# Patient Record
Sex: FEMALE | Race: WHITE | Employment: FULL TIME | URBAN - METROPOLITAN AREA
[De-identification: names, ages, dates, MRNs, and addresses within clinical notes are randomized per-mention and may not be internally consistent; named-entity substitution may affect disease eponyms.]

---

## 2017-05-11 ENCOUNTER — EMPLOYEE WELLNESS (OUTPATIENT)
Dept: OTHER | Age: 42
End: 2017-05-11

## 2017-05-11 LAB
CHOLESTEROL, TOTAL: 149 MG/DL (ref 0–199)
GLUCOSE BLD-MCNC: 86 MG/DL (ref 70–99)
HDLC SERPL-MCNC: 42 MG/DL (ref 40–60)
LDL CHOLESTEROL CALCULATED: 94 MG/DL
TRIGL SERPL-MCNC: 66 MG/DL (ref 0–150)

## 2017-05-15 ENCOUNTER — TELEPHONE (OUTPATIENT)
Dept: FAMILY MEDICINE CLINIC | Age: 42
End: 2017-05-15

## 2018-02-16 DIAGNOSIS — B00.1 HERPES LABIALIS: ICD-10-CM

## 2018-02-16 RX ORDER — VALACYCLOVIR HYDROCHLORIDE 1 G/1
2000 TABLET, FILM COATED ORAL 2 TIMES DAILY
Qty: 4 TABLET | Refills: 0 | OUTPATIENT
Start: 2018-02-16

## 2018-03-20 VITALS — BODY MASS INDEX: 24.56 KG/M2 | WEIGHT: 130 LBS

## 2018-05-04 ENCOUNTER — EMPLOYEE WELLNESS (OUTPATIENT)
Dept: OTHER | Age: 43
End: 2018-05-04

## 2018-05-05 LAB
CHOLESTEROL, TOTAL: 156 MG/DL (ref 0–199)
GLUCOSE BLD-MCNC: 63 MG/DL (ref 70–99)
HDLC SERPL-MCNC: 48 MG/DL (ref 40–60)
LDL CHOLESTEROL CALCULATED: 98 MG/DL
TRIGL SERPL-MCNC: 48 MG/DL (ref 0–150)

## 2018-05-14 VITALS — WEIGHT: 139 LBS | BODY MASS INDEX: 26.26 KG/M2

## 2019-04-12 ENCOUNTER — HOSPITAL ENCOUNTER (EMERGENCY)
Age: 44
Discharge: HOME | End: 2019-04-12
Payer: COMMERCIAL

## 2019-04-12 ENCOUNTER — APPOINTMENT (OUTPATIENT)
Dept: CT IMAGING | Age: 44
End: 2019-04-12
Payer: COMMERCIAL

## 2019-04-12 VITALS
HEIGHT: 61 IN | RESPIRATION RATE: 15 BRPM | DIASTOLIC BLOOD PRESSURE: 82 MMHG | BODY MASS INDEX: 26.24 KG/M2 | WEIGHT: 139 LBS | SYSTOLIC BLOOD PRESSURE: 139 MMHG | HEART RATE: 75 BPM | OXYGEN SATURATION: 98 % | TEMPERATURE: 98.5 F
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
## 2019-04-12 DIAGNOSIS — R10.30 LOWER ABDOMINAL PAIN: ICD-10-CM

## 2019-04-12 DIAGNOSIS — S16.1XXA ACUTE STRAIN OF NECK MUSCLE, INITIAL ENCOUNTER: ICD-10-CM

## 2019-04-12 LAB
A/G RATIO: 1.1 (ref 1.1–2.2)
ALBUMIN SERPL-MCNC: 4.3 G/DL (ref 3.4–5)
ALP BLD-CCNC: 54 U/L (ref 40–129)
ALT SERPL-CCNC: 15 U/L (ref 10–40)
ANION GAP SERPL CALCULATED.3IONS-SCNC: 14 MMOL/L (ref 3–16)
AST SERPL-CCNC: 22 U/L (ref 15–37)
BACTERIA: ABNORMAL /HPF
BASOPHILS ABSOLUTE: 0 K/UL (ref 0–0.2)
BASOPHILS RELATIVE PERCENT: 0.2 %
BILIRUB SERPL-MCNC: 0.3 MG/DL (ref 0–1)
BILIRUBIN URINE: NEGATIVE
BLOOD, URINE: ABNORMAL
BUN BLDV-MCNC: 12 MG/DL (ref 7–20)
CALCIUM SERPL-MCNC: 9.5 MG/DL (ref 8.3–10.6)
CHLORIDE BLD-SCNC: 103 MMOL/L (ref 99–110)
CLARITY: CLEAR
CO2: 21 MMOL/L (ref 21–32)
COLOR: YELLOW
COMMENT UA: ABNORMAL
CREAT SERPL-MCNC: 0.7 MG/DL (ref 0.6–1.1)
EOSINOPHILS ABSOLUTE: 0.1 K/UL (ref 0–0.6)
EOSINOPHILS RELATIVE PERCENT: 0.7 %
EPITHELIAL CELLS, UA: ABNORMAL /HPF
GFR AFRICAN AMERICAN: >60
GFR NON-AFRICAN AMERICAN: >60
GLOBULIN: 3.8 G/DL
GLUCOSE BLD-MCNC: 138 MG/DL (ref 70–99)
GLUCOSE URINE: NEGATIVE MG/DL
HCG QUALITATIVE: NEGATIVE
HCT VFR BLD CALC: 41.8 % (ref 36–48)
HEMOGLOBIN: 13.9 G/DL (ref 12–16)
KETONES, URINE: NEGATIVE MG/DL
LEUKOCYTE ESTERASE, URINE: NEGATIVE
LIPASE: 32 U/L (ref 13–60)
LYMPHOCYTES ABSOLUTE: 1.8 K/UL (ref 1–5.1)
LYMPHOCYTES RELATIVE PERCENT: 19.8 %
MCH RBC QN AUTO: 30.5 PG (ref 26–34)
MCHC RBC AUTO-ENTMCNC: 33.3 G/DL (ref 31–36)
MCV RBC AUTO: 91.6 FL (ref 80–100)
MICROSCOPIC EXAMINATION: YES
MONOCYTES ABSOLUTE: 0.6 K/UL (ref 0–1.3)
MONOCYTES RELATIVE PERCENT: 6.3 %
NEUTROPHILS ABSOLUTE: 6.8 K/UL (ref 1.7–7.7)
NEUTROPHILS RELATIVE PERCENT: 73 %
NITRITE, URINE: NEGATIVE
PDW BLD-RTO: 13.9 % (ref 12.4–15.4)
PH UA: 6.5 (ref 5–8)
PLATELET # BLD: 268 K/UL (ref 135–450)
PMV BLD AUTO: 9 FL (ref 5–10.5)
POTASSIUM SERPL-SCNC: 3.8 MMOL/L (ref 3.5–5.1)
PROTEIN UA: NEGATIVE MG/DL
RBC # BLD: 4.56 M/UL (ref 4–5.2)
RBC UA: ABNORMAL /HPF (ref 0–2)
SODIUM BLD-SCNC: 138 MMOL/L (ref 136–145)
SPECIFIC GRAVITY UA: 1.01 (ref 1–1.03)
TOTAL PROTEIN: 8.1 G/DL (ref 6.4–8.2)
URINE REFLEX TO CULTURE: ABNORMAL
URINE TYPE: ABNORMAL
UROBILINOGEN, URINE: 0.2 E.U./DL
WBC # BLD: 9.3 K/UL (ref 4–11)
WBC UA: ABNORMAL /HPF (ref 0–5)

## 2019-04-12 PROCEDURE — 81001 URINALYSIS AUTO W/SCOPE: CPT

## 2019-04-12 PROCEDURE — 99284 EMERGENCY DEPT VISIT MOD MDM: CPT

## 2019-04-12 PROCEDURE — 74177 CT ABD & PELVIS W/CONTRAST: CPT

## 2019-04-12 PROCEDURE — 36415 COLL VENOUS BLD VENIPUNCTURE: CPT

## 2019-04-12 PROCEDURE — 85025 COMPLETE CBC W/AUTO DIFF WBC: CPT

## 2019-04-12 PROCEDURE — 6360000004 HC RX CONTRAST MEDICATION: Performed by: PHYSICIAN ASSISTANT

## 2019-04-12 PROCEDURE — 84703 CHORIONIC GONADOTROPIN ASSAY: CPT

## 2019-04-12 PROCEDURE — 83690 ASSAY OF LIPASE: CPT

## 2019-04-12 PROCEDURE — 72125 CT NECK SPINE W/O DYE: CPT

## 2019-04-12 PROCEDURE — 80053 COMPREHEN METABOLIC PANEL: CPT

## 2019-04-12 RX ORDER — IBUPROFEN 600 MG/1
600 TABLET ORAL 3 TIMES DAILY
Qty: 21 TABLET | Refills: 0 | Status: SHIPPED | OUTPATIENT
Start: 2019-04-12 | End: 2019-06-07 | Stop reason: CLARIF

## 2019-04-12 RX ORDER — CYCLOBENZAPRINE HCL 10 MG
10 TABLET ORAL 3 TIMES DAILY PRN
Qty: 20 TABLET | Refills: 0 | Status: SHIPPED | OUTPATIENT
Start: 2019-04-12 | End: 2019-04-22

## 2019-04-12 RX ADMIN — IOPAMIDOL 75 ML: 755 INJECTION, SOLUTION INTRAVENOUS at 15:11

## 2019-04-12 ASSESSMENT — PAIN DESCRIPTION - LOCATION
LOCATION_2: ABDOMEN
LOCATION: NECK
LOCATION: NECK

## 2019-04-12 ASSESSMENT — PAIN DESCRIPTION - ONSET
ONSET: ON-GOING
ONSET_2: ON-GOING
ONSET: ON-GOING

## 2019-04-12 ASSESSMENT — PAIN DESCRIPTION - ORIENTATION
ORIENTATION: MID
ORIENTATION: MID

## 2019-04-12 ASSESSMENT — PAIN DESCRIPTION - PROGRESSION
CLINICAL_PROGRESSION_2: NOT CHANGED
CLINICAL_PROGRESSION: NOT CHANGED
CLINICAL_PROGRESSION: NOT CHANGED

## 2019-04-12 ASSESSMENT — PAIN DESCRIPTION - FREQUENCY
FREQUENCY: CONTINUOUS
FREQUENCY: CONTINUOUS

## 2019-04-12 ASSESSMENT — PAIN DESCRIPTION - INTENSITY: RATING_2: 6

## 2019-04-12 ASSESSMENT — PAIN DESCRIPTION - PAIN TYPE
TYPE: ACUTE PAIN
TYPE: ACUTE PAIN

## 2019-04-12 ASSESSMENT — PAIN DESCRIPTION - DESCRIPTORS
DESCRIPTORS_2: ACHING
DESCRIPTORS: ACHING
DESCRIPTORS: ACHING

## 2019-04-12 ASSESSMENT — PAIN DESCRIPTION - DURATION: DURATION_2: CONTINUOUS

## 2019-04-12 ASSESSMENT — PAIN SCALES - GENERAL
PAINLEVEL_OUTOF10: 2
PAINLEVEL_OUTOF10: 3

## 2019-04-12 ASSESSMENT — PAIN - FUNCTIONAL ASSESSMENT: PAIN_FUNCTIONAL_ASSESSMENT: PREVENTS OR INTERFERES SOME ACTIVE ACTIVITIES AND ADLS
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
## 2019-04-13 ASSESSMENT — ENCOUNTER SYMPTOMS
ABDOMINAL PAIN: 1
SORE THROAT: 0
BACK PAIN: 0
SHORTNESS OF BREATH: 0
VOMITING: 0
NAUSEA: 0
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
## 2019-05-13 ENCOUNTER — OFFICE VISIT (OUTPATIENT)
Dept: OBGYN CLINIC | Age: 44
End: 2019-05-13
Payer: COMMERCIAL

## 2019-05-13 VITALS
HEIGHT: 61 IN | HEART RATE: 72 BPM | TEMPERATURE: 98.2 F | WEIGHT: 144 LBS | BODY MASS INDEX: 27.19 KG/M2 | DIASTOLIC BLOOD PRESSURE: 72 MMHG | SYSTOLIC BLOOD PRESSURE: 110 MMHG

## 2019-05-13 DIAGNOSIS — N92.0 MENORRHAGIA WITH REGULAR CYCLE: ICD-10-CM

## 2019-05-13 DIAGNOSIS — Z12.4 PAP SMEAR FOR CERVICAL CANCER SCREENING: ICD-10-CM

## 2019-05-13 DIAGNOSIS — Z01.419 ENCNTR FOR GYN EXAM (GENERAL) (ROUTINE) W/O ABN FINDINGS: Primary | ICD-10-CM

## 2019-05-13 DIAGNOSIS — B00.1 HERPES LABIALIS: ICD-10-CM

## 2019-05-13 DIAGNOSIS — Z12.39 BREAST CANCER SCREENING: ICD-10-CM

## 2019-05-13 PROCEDURE — 99386 PREV VISIT NEW AGE 40-64: CPT | Performed by: OBSTETRICS & GYNECOLOGY

## 2019-05-13 NOTE — PROGRESS NOTES
Last PAP was normal; April/2013. Abnormal pap history?  Yes 2008  Last HPV screen: 2013 negative  Patient has never had a mammogram.  Last Dexa Scan: N/A    Contraceptive method: Tubal ligation ESSURE  No prior colonoscopy

## 2019-05-13 NOTE — PROGRESS NOTES
Annual Exam      CC:   Chief Complaint   Patient presents with    New Patient       HPI:  37 y.o. [id number] presents for her gynecologic annual exam.    Patient seen and examined. Patient is doing well without complaints. Reports last pap smear was 2013 and was negative. Reports in [id number] she had an abnormal pap smear, \"HPV changes\" and underwent colposcopy with normal biopsies. She had a repeat pap smear in 6 months and again in 12 months which were all negative. Patient has never had a mammogram.      Reports menses are regular, every 28 days. States they are heavy the first 1-3 days with \"lots of clotting,\" reporting dime sized clots, and then only light spotting for 2-3 days after. Denies dizziness, lightheadedness, chest pain, palpitations, shortness of breath with heavy bleeding. Tubal ligation with Essure in . Reports history of  delivery x2. First  was for non-reassuring fetal status remote from delivery. Denies hx of preeclampsia, gestational diabetes, labor dystocia or higher order tears. Reports she began to have slightly elevated BP at the end of her first pregnancy, however no diagnosis of pre-e. Health Maintenance:  Birth control: Essure   Pregnancy plans: None  Safe relationship: Single    Screening:  Last pap smear: 2013  History of abnormal pap smears: [id number] - colposcopy and repeat pap smears have been normal  Mammogram: Has never had  Colonoscopy: Has not had    Review of Systems:   Review of Systems   Constitutional: Negative for chills and fever. HENT: Negative for congestion, sinus pressure, sneezing and sore throat. Eyes: Negative for discharge and itching. Respiratory: Negative for cough and shortness of breath. Cardiovascular: Negative for chest pain and palpitations. Gastrointestinal: Negative for abdominal pain, constipation, diarrhea, nausea and vomiting.    Genitourinary: Positive for vaginal bleeding (heavy first 3 days of menses). Negative for dysuria, frequency, menstrual problem, pelvic pain and vaginal discharge. Musculoskeletal: Negative. Skin: Negative. Neurological: Negative for dizziness. Psychiatric/Behavioral: Negative. Primary Care Physician: Jeromy Bell MD    Obstetric History  OB History    Para Term  AB Living   3 2 2 0 1 2   SAB TAB Ectopic Molar Multiple Live Births   0 1 0   0 2      # Outcome Date GA Lbr Ramiro/2nd Weight Sex Delivery Anes PTL Lv   3 Term 10/11/99 40w0d  7 lb 11 oz (3.487 kg) M CS-Unspec Spinal  MARY   2 Term 97 40w0d  8 lb 13 oz (3.997 kg) M CS-Unspec Spinal  MARY      Complications: Nuchal cord affecting delivery   1 TAB                Gynecologic History  Menstrual History:   LMP: Patient's last menstrual period was 2019.    Menstrual Period: regular   Interval Between Menses: 28 days   Duration of Menses: 5-6 days   Menstrual Flow: Heavy to light   Bleeding between menses: Denies   Denies history of fibroids, endometriosis, ovarian cysts   Mother had a hysterectomy for uterine fibroids    Sexual History:   Contraception: see above   Currently is not sexually active   10 Lifetime partners   Denies history of STIs - cold sores   Denies sexual problems    Pap History:   History of abnormal pap smears: see above   Last pap: see above      Medical History:  Past Medical History:   Diagnosis Date    Encounter for Essure implantation 3/2015    Per gyn    Herpes labialis 4/3/2014    Hyperlipidemia 2014    Normal delivery     Term    Obese 2014    Pap smear for cervical cancer screening 2013    Normal:Dr. Trammell:GYN       Medications:  Current Outpatient Medications   Medication Sig Dispense Refill    valACYclovir (VALTREX) 1 G tablet Take 2 tablets by mouth 2 times daily 4 tablet 0    ibuprofen (ADVIL;MOTRIN) 600 MG tablet Take 1 tablet by mouth 3 times daily for 7 days 21 tablet 0     No current facility-administered

## 2019-05-15 LAB
HPV COMMENT: NORMAL
HPV TYPE 16: NOT DETECTED
HPV TYPE 18: NOT DETECTED
HPVOH (OTHER TYPES): NOT DETECTED

## 2019-05-15 ASSESSMENT — ENCOUNTER SYMPTOMS
EYE DISCHARGE: 0
DIARRHEA: 0
EYE ITCHING: 0
COUGH: 0
ABDOMINAL PAIN: 0
VOMITING: 0
CONSTIPATION: 0
SORE THROAT: 0
SINUS PRESSURE: 0
SHORTNESS OF BREATH: 0
NAUSEA: 0

## 2019-05-20 ENCOUNTER — EMPLOYEE WELLNESS (OUTPATIENT)
Dept: OTHER | Age: 44
End: 2019-05-20

## 2019-05-20 LAB
CHOLESTEROL, TOTAL: 177 MG/DL (ref 0–199)
GLUCOSE BLD-MCNC: 92 MG/DL (ref 70–99)
HDLC SERPL-MCNC: 48 MG/DL (ref 40–60)
LDL CHOLESTEROL CALCULATED: 110 MG/DL
TRIGL SERPL-MCNC: 96 MG/DL (ref 0–150)

## 2019-05-28 ENCOUNTER — TELEPHONE (OUTPATIENT)
Dept: FAMILY MEDICINE CLINIC | Age: 44
End: 2019-05-28

## 2019-05-28 VITALS — BODY MASS INDEX: 27.21 KG/M2 | WEIGHT: 144 LBS

## 2019-05-29 ENCOUNTER — OFFICE VISIT (OUTPATIENT)
Dept: OBGYN CLINIC | Age: 44
End: 2019-05-29
Payer: COMMERCIAL

## 2019-05-29 VITALS
BODY MASS INDEX: 26.83 KG/M2 | SYSTOLIC BLOOD PRESSURE: 122 MMHG | HEART RATE: 96 BPM | TEMPERATURE: 98.2 F | WEIGHT: 142 LBS | DIASTOLIC BLOOD PRESSURE: 80 MMHG

## 2019-05-29 DIAGNOSIS — R93.89 ABNORMAL PELVIC ULTRASOUND: ICD-10-CM

## 2019-05-29 DIAGNOSIS — N92.0 MENORRHAGIA WITH REGULAR CYCLE: Primary | ICD-10-CM

## 2019-05-29 DIAGNOSIS — Z32.02 URINE PREGNANCY TEST NEGATIVE: ICD-10-CM

## 2019-05-29 LAB
CONTROL: NORMAL
PREGNANCY TEST URINE, POC: NEGATIVE

## 2019-05-29 PROCEDURE — 99999 PR OFFICE/OUTPT VISIT,PROCEDURE ONLY: CPT | Performed by: OBSTETRICS & GYNECOLOGY

## 2019-05-29 PROCEDURE — 76856 US EXAM PELVIC COMPLETE: CPT | Performed by: OBSTETRICS & GYNECOLOGY

## 2019-05-29 PROCEDURE — 58100 BIOPSY OF UTERUS LINING: CPT | Performed by: OBSTETRICS & GYNECOLOGY

## 2019-05-29 PROCEDURE — 81025 URINE PREGNANCY TEST: CPT | Performed by: OBSTETRICS & GYNECOLOGY

## 2019-05-29 NOTE — PROGRESS NOTES
Return Gyn Office Visit    CC:   Chief Complaint   Patient presents with    Menorrhagia     follow up after US       HPI:  Janie Child is a 37 y.o. female who presents for ultrasound and follow-up of heavy menses. Patient is seen and examined today. Patient reports her last cycle was lighter than it has been. Finished 5/25/2019. No new concerns or complaints. Review of Systems - The following ROS was otherwise negative, except as noted in the HPI: constitutional, respiratory, cardiovascular, gastrointestinal, genitourinary    Objective:  /80 (Site: Right Upper Arm, Position: Sitting, Cuff Size: Medium Adult)   Pulse 96   Temp 98.2 °F (36.8 °C) (Oral)   Wt 142 lb (64.4 kg)   LMP 05/14/2019 (Exact Date)   Breastfeeding? No   BMI 26.83 kg/m²     Physical Exam   Constitutional: She is oriented to person, place, and time. She appears well-developed and well-nourished. No distress. HENT:   Head: Normocephalic and atraumatic. Eyes: Conjunctivae are normal.   Cardiovascular: Normal rate. Pulmonary/Chest: Effort normal. No respiratory distress. Genitourinary: There is no rash, tenderness or lesion on the right labia. There is no rash, tenderness or lesion on the left labia. Cervix exhibits no motion tenderness, no discharge and no friability. No erythema or tenderness in the vagina. No signs of injury around the vagina. No vaginal discharge found. Genitourinary Comments: Nabothian cyst on the left aspect of the cervix   Musculoskeletal: Normal range of motion. She exhibits no edema. Neurological: She is alert and oriented to person, place, and time. Skin: Skin is warm and dry. Psychiatric: She has a normal mood and affect. Her behavior is normal. Thought content normal.     Ultrasound:   Impression   PELVIC ULTRASOUND without DOPPLER INTERROGATION    NON OB       DATE: 5/29/19       [facility] [place]        SONOGRAPHER: Wilber Ozuna RDMS       INDICATION: menorrhagia       TYPE OF SCAN: vaginal       FINDINGS:     The cul de sac is normal. No free fluid is appreciated.        The cervix is normal and not enlarged. Nabothian cyst/s is noted within the uterine cervix.       The uterus measures 9.93cm x 5.40cm x 4.41cm.     The uterus is anteverted. The endometrium measures 12.80 mm and appears heterogeneous and irregular in contour. The myometrium is homogeneous in appearance. No uterine anomalies are noted.        The right ovary is present and normal.     The right ovary measures 2.61cm x 2.34cm x 2.91cm.     Ovary findings:  no masses seen. The right adnexa is normal.       The left ovary is present and normal.     The left ovary measures 4.05cm x 2.69cm x 2.85cm.     Ovary findings:  no masses seen. The left adnexa is normal.         Endometrial Biopsy Procedure Note      Indication: Menorrhagia with regular cycle, Thickened, irregular endometrium on US    Urine pregnancy test: Negative 5/29/2019    Consent:  The risks of the procedure were discussed with the patient. She was aware these risks include, but are not limited to bleeding, infection and damage to surrounding tissue. She elected to proceed with the endometrial biopsy. Written consent was obtained. Procedure Details: The patient was placed in the dorsal lithotomy position. A speculum was placed in the vagina, and the cervix was cleansed with betadine. A tenaculum was placed on the anterior lip of the cervix. The cervix was not dilated. An endometrial pipelle was advanced to the fundus and a sample was gently collected. The uterus sounded to 10 cm. Specimen was adequate after 2 attempt(s). The tenaculum was removed and adequate hemostasis was noted without intervention. EBL: Minimal    Complications: None    Patient tolerated the procedure well. Plan:   Patient was instructed to take OTC motrin and/or tylenol for pain control  After care instructions discussed.    Will follow up with results and treatment plan. Assessment/Plan:     Sravani Lozano is a 37 y.o. female who presents for ultrasound and follow-up for menorrhagia    1. Menorrhagia with regular cycle     - Discussed etiology with patient - given irregular contour of endometrium cannot rule out endometrial polyps     - EMB vs. Hysteroscopy D&C discussed with the patient - will proceed with EMB for evaluation of endometrium today     - R/B/A of management of menorrhagia discussed with the patient, including medical management via OCPs (no tobacco use, hx of VTE or hx of migraines with aura), LARC and surgical management including ablation vs hysterectomy     - Patient would like to await biopsy results and discuss further at that time. 2. Abnormal pelvic ultrasound     - Discussed etiology with patient - given irregular contour of endometrium cannot rule out endometrial polyps     - EMB vs. Hysteroscopy D&C discussed with the patient - will proceed with EMB for evaluation of endometrium today     - R/B/A of management of menorrhagia discussed with the patient, including medical management via OCPs (no tobacco use, hx of VTE or hx of migraines with aura), LARC and surgical management including ablation vs hysterectomy     - Patient would like to await biopsy results and discuss further at that time.      3. Urine pregnancy test negative       Abran Meza DO

## 2019-06-07 ENCOUNTER — OFFICE VISIT (OUTPATIENT)
Dept: FAMILY MEDICINE CLINIC | Age: 44
End: 2019-06-07
Payer: COMMERCIAL

## 2019-06-07 VITALS
TEMPERATURE: 98.6 F | OXYGEN SATURATION: 98 % | HEART RATE: 82 BPM | HEIGHT: 61 IN | WEIGHT: 144.5 LBS | SYSTOLIC BLOOD PRESSURE: 116 MMHG | RESPIRATION RATE: 16 BRPM | BODY MASS INDEX: 27.28 KG/M2 | DIASTOLIC BLOOD PRESSURE: 72 MMHG

## 2019-06-07 DIAGNOSIS — Z00.00 ROUTINE GENERAL MEDICAL EXAMINATION AT A HEALTH CARE FACILITY: Primary | ICD-10-CM

## 2019-06-07 DIAGNOSIS — N92.0 MENORRHAGIA WITH REGULAR CYCLE: ICD-10-CM

## 2019-06-07 DIAGNOSIS — E66.3 OVERWEIGHT (BMI 25.0-29.9): ICD-10-CM

## 2019-06-07 DIAGNOSIS — E78.2 HYPERLIPIDEMIA, MIXED: ICD-10-CM

## 2019-06-07 PROCEDURE — 99386 PREV VISIT NEW AGE 40-64: CPT | Performed by: FAMILY MEDICINE

## 2019-06-07 RX ORDER — M-VIT,TX,IRON,MINS/CALC/FOLIC 27MG-0.4MG
1 TABLET ORAL DAILY
COMMUNITY

## 2019-06-07 ASSESSMENT — ENCOUNTER SYMPTOMS
TROUBLE SWALLOWING: 0
ANAL BLEEDING: 0
EYE DISCHARGE: 0
COUGH: 0
APNEA: 0
COLOR CHANGE: 0
STRIDOR: 0
SINUS PAIN: 0
EYE ITCHING: 0
SINUS PRESSURE: 0
BACK PAIN: 0
BLOOD IN STOOL: 0
DIARRHEA: 0
FACIAL SWELLING: 0
PHOTOPHOBIA: 0
CHEST TIGHTNESS: 0
CHOKING: 0
SORE THROAT: 0
ABDOMINAL PAIN: 0
WHEEZING: 0
VOICE CHANGE: 0
VOMITING: 0
CONSTIPATION: 0
NAUSEA: 0
EYE PAIN: 0
EYE REDNESS: 0
ABDOMINAL DISTENTION: 0
SHORTNESS OF BREATH: 0
RHINORRHEA: 0
RECTAL PAIN: 0

## 2019-06-07 ASSESSMENT — PATIENT HEALTH QUESTIONNAIRE - PHQ9
2. FEELING DOWN, DEPRESSED OR HOPELESS: 0
SUM OF ALL RESPONSES TO PHQ9 QUESTIONS 1 & 2: 0
SUM OF ALL RESPONSES TO PHQ QUESTIONS 1-9: 0
SUM OF ALL RESPONSES TO PHQ QUESTIONS 1-9: 0
1. LITTLE INTEREST OR PLEASURE IN DOING THINGS: 0

## 2019-06-07 NOTE — PROGRESS NOTES
Subjective:      Patient ID: Christine Copeland is a 37 y.o. female. HPI    [address] Name  05/20/19  11:59:59       Biometric Data   Date of Screening  05/20/19       Height (Feet)  5       Height (inches)  1       Weight  144 lb (65.3 kg)       Waist (Inches)  31 in       Pulse  --       BMI (Calculated)  45.8       Systolic  987       Diastolic  72       Fasting (Y/N)  Y       Cholesterol  177       Cholesterol Reference Range  < 200 mg/dL       HDL  48       HDL Reference Range  > 40mg/dL       LDL  110Abnormal        LDL Reference Range  < 100mg/dL       Triglycerides  96       Triglycerides Reference Range  < 150mg/dL       Fasting Glucose  92       Glucose Abnormal  --       Glucose Reference Range  74 - 109 mg/dL       A1C  --       A1C Reference Range  < 5.7 Percent       Tobacco Free  --       Tobacco Range  --       Performing Lab  [id number]/24/3167       Source of Data  --       Weight (RETIRED)  --       Self Assessment   Assessment Date  04/26/19       I'm always thinking the worst is going to happen  Neither Agree nor  Disagree       In the last month, I have felt really anxious and worried  Strongly Agree       I have been under a lot of stress in the last month  Strongly Agree       I get upset easily  Agree       I regularly feel panicky  Neither Agree nor  Disagree       I am usually pretty calm  Neither Agree nor  Disagree       I tend to think constantly about all of my troubles  Agree       I am very affected by my negative thoughts  Agree       I sometimes feel depressed  Agree       I have trouble concentrating  Disagree       I sometimes cry for long periods of time  Agree       I usually wake up feeling rested  Disagree       I wake up frequently when I'm trying to sleep  Strongly Agree       Overall, how would you describe your health?   Fair       Managing Stress and Anxiety  2.2       Wellness Participation   Opt-In Date  05/27/14       Engagement in [facility]  Y 37y.o female presenting for:Routine Physical exam  Pap Smear:2015=nml. Mammogram:N/A:pt' aware screening starts at 39yrs age. Performs self-breast exams:no. Birth control:essure  Eye check:<12mos  Dental check:<12mos  Lipid panel:biometrics screen:see above:states she is not depressed. Colonoscopy:N/A  Immunizations:see below. Smoker:no. Exercise:formal regime:30-40mins;cardio(stairs);weights. Denies any other/new concerns. Immunization History   Administered Date(s) Administered    DTP 1975, 1976, 1976, 1977, 1981    Hepatitis B, unspecified formulation 2007, 2008, 2008    IPV (Ipol) 1975, 1976, 1976, 1977, 1981    Influenza Vaccine, unspecified formulation 10/01/2016    Influenza Virus Vaccine 10/11/2013, 10/03/2017, 10/12/2018    MMR 1976    Td, unspecified formulation 2007       No Known Allergies    Current Outpatient Medications on File Prior to Visit   Medication Sig Dispense Refill    Multiple Vitamins-Minerals (THERAPEUTIC MULTIVITAMIN-MINERALS) tablet Take 1 tablet by mouth daily      Omega-3 Fatty Acids (FISH OIL PO) Take by mouth daily       No current facility-administered medications on file prior to visit.       Past Medical History:   Diagnosis Date    Encounter for Essure implantation 3/2015    Per gyn    Herpes labialis 4/3/2014    Hyperlipidemia 2014    Normal delivery     Term    Obese 2014    Pap smear for cervical cancer screening 2013    Normal:Dr. Trammell:GYN         Past Surgical History:   Procedure Laterality Date     SECTION      x 2    TUBAL LIGATION      Essure    WISDOM TOOTH EXTRACTION         Social History     Tobacco Use    Smoking status: Never Smoker    Smokeless tobacco: Never Used   Substance Use Topics    Alcohol use: Yes     Comment: once a month     Drug use: No     Social History     Substance and Sexual Activity   Drug Use No Family History   Problem Relation Age of Onset    Depression Mother     Heart Attack Father     Cancer Maternal Grandmother 79        breast    Cancer Maternal Grandfather         non-hodgkins    Diabetes Paternal Grandmother     Cancer Paternal Grandfather         lung               Review of Systems   Constitutional: Negative for activity change, appetite change, chills, diaphoresis, fatigue, fever and unexpected weight change. Negative for:Difficulty sleeping/night sweats/unexplained weight loss or gain/malaise   HENT: Negative for congestion, dental problem, drooling, ear discharge, ear pain, facial swelling, hearing loss, mouth sores, nosebleeds, postnasal drip, rhinorrhea, sinus pressure, sinus pain, sneezing, sore throat, tinnitus, trouble swallowing and voice change. Negative for:Dizziness/vertigo   Eyes: Negative for photophobia, pain, discharge, redness, itching and visual disturbance. Respiratory: Negative for apnea, cough, choking, chest tightness, shortness of breath, wheezing and stridor. Negative for:Hemoptysis/hoarseness/pain on inspiration. Breasts:tenderness/masses/nipple discharge/skin changes. Cardiovascular: Negative for chest pain, palpitations and leg swelling. Negative for:Syncope/presyncope/lower extremity edema/claudication/PND/irregular heart beats   Gastrointestinal: Negative for abdominal distention, abdominal pain, anal bleeding, blood in stool, constipation, diarrhea, nausea, rectal pain and vomiting. Negative for:Melena/bloating/dysphagia/reflux/loss of appetite   Endocrine: Negative for cold intolerance, heat intolerance, polydipsia, polyphagia and polyuria. Genitourinary: Positive for menstrual problem. Negative for decreased urine volume, difficulty urinating, dyspareunia, dysuria, enuresis, flank pain, frequency, genital sores, hematuria, pelvic pain, urgency, vaginal bleeding, vaginal discharge and vaginal pain. clubbing. Capillary refill=2-3secs   Psychiatric: She has a normal mood and affect. Her speech is normal and behavior is normal. Judgment and thought content normal. She is not actively hallucinating. Cognition and memory are normal. She is attentive. Assessment:       Diagnosis Orders   1. Routine general medical examination at a health care facility  VSS/well appearing. Doing well. 2. Menorrhagia with regular cycle  Per gyn. 3. Overweight (BMI 25.0-29. 9)  Diet & exercise regime reviewed. 4. Hyperlipidemia, mixed  Stable. Labs in [address] [place] [address]. Comprehensive Metabolic Panel    Lipid Panel           Plan:      Obtain labs/diagnostic tests as discussed today & call back for results within 2-7days. Pt' left office in good condition. Advised to go to local ER or call 911 for any worrisome signs/sx.

## 2019-06-11 ENCOUNTER — TELEPHONE (OUTPATIENT)
Dept: OBGYN CLINIC | Age: 44
End: 2019-06-11

## 2019-06-17 ENCOUNTER — TELEPHONE (OUTPATIENT)
Dept: FAMILY MEDICINE CLINIC | Age: 44
End: 2019-06-17

## 2019-06-17 ENCOUNTER — OFFICE VISIT (OUTPATIENT)
Dept: OBGYN CLINIC | Age: 44
End: 2019-06-17

## 2019-06-17 VITALS
WEIGHT: 147 LBS | TEMPERATURE: 98.4 F | HEART RATE: 74 BPM | SYSTOLIC BLOOD PRESSURE: 122 MMHG | BODY MASS INDEX: 27.78 KG/M2 | DIASTOLIC BLOOD PRESSURE: 64 MMHG

## 2019-06-17 DIAGNOSIS — N92.0 MENORRHAGIA WITH REGULAR CYCLE: Primary | ICD-10-CM

## 2019-06-17 PROCEDURE — 99024 POSTOP FOLLOW-UP VISIT: CPT | Performed by: OBSTETRICS & GYNECOLOGY

## 2019-06-17 NOTE — TELEPHONE ENCOUNTER
Pre op: 6/24/20019: Ablasion    Patient needs to schedule an appt and was told there was an opening on 6/20/19. Please call patient to schedule. OV: 6/7/2019  CB: [phone]    Please advise.

## 2019-06-17 NOTE — PROGRESS NOTES
measures 12.80 mm and appears heterogeneous and irregular in contour. The myometrium is homogeneous in appearance. No uterine anomalies are noted.        The right ovary is present and normal.     The right ovary measures 2.61cm x 2.34cm x 2.91cm.     Ovary findings:  no masses seen. The right adnexa is normal.       The left ovary is present and normal.     The left ovary measures 4.05cm x 2.69cm x 2.85cm.     Ovary findings:  no masses seen. The left adnexa is normal.     Pathology:   FINAL DIAGNOSIS:    Endometrial biopsy:  - Proliferative phase endometrium.  - Negative for hyperplasia or malignancy.  VANDANA/VANDANA    Assessment/Plan:     Misty Velásquez is a 37 y.o. female who presents to sign consents for upcoming hysteroscopy, D&C and Novasure endometrial ablation    1. Menorrhagia with regular cycle     - Discussed etiology with patient - given irregular contour of endometrium cannot rule out endometrial polyps     - EMB with proliferative endometrium negative for hyperplasia or malignancy     - R/B/A of management of menorrhagia discussed with the patient, including medical management via OCPs (no tobacco use, hx of VTE or hx of migraines with aura), LARC and surgical management including ablation vs hysterectomy     - Patient is interested in endometrial ablation at this time     - Risks, benefits and alternatives were discussed with the patient. All questions were answered to the patient's satisfaction.   Consents are signed and in chart     - Patient reports tolerance to Ibuprofen and Norco for post-op pain control      - Has appointment scheduled with PCP 6/20/2019 for H&P     - Surgery scheduled for 6/24/2019 at [facility]

## 2019-06-20 ENCOUNTER — TELEPHONE (OUTPATIENT)
Dept: OBGYN CLINIC | Age: 44
End: 2019-06-20

## 2019-06-21 NOTE — TELEPHONE ENCOUNTER
Spoke to patient wanting to wait until the beginning of August for surgery. Informed patient I would check surgery dates with ASC and recall.

## 2019-08-06 ENCOUNTER — TELEPHONE (OUTPATIENT)
Dept: OBGYN CLINIC | Age: 44
End: 2019-08-06

## 2019-08-06 NOTE — TELEPHONE ENCOUNTER
Devora from [facility] surgery calling to let the office know pt missed her scheduled pre-op appointment for H&P. They did try several time to contact pt and are not able to get a hold of her. Scheduled for surgery 8/8/19.

## 2019-08-07 NOTE — TELEPHONE ENCOUNTER
Called patient to discuss upcoming surgery as H&P was not performed. Patient was not able to get into her PCP to schedule appointment. Call transferred to Michael Woods for surgery scheduling.

## 2019-09-03 ENCOUNTER — TELEPHONE (OUTPATIENT)
Dept: OBGYN CLINIC | Age: 44
End: 2019-09-03

## 2019-09-23 ENCOUNTER — OFFICE VISIT (OUTPATIENT)
Dept: OBGYN CLINIC | Age: 44
End: 2019-09-23

## 2019-09-23 VITALS
TEMPERATURE: 98 F | BODY MASS INDEX: 31.09 KG/M2 | SYSTOLIC BLOOD PRESSURE: 112 MMHG | DIASTOLIC BLOOD PRESSURE: 74 MMHG | WEIGHT: 159.2 LBS | HEART RATE: 68 BPM

## 2019-09-23 DIAGNOSIS — N92.0 MENORRHAGIA WITH REGULAR CYCLE: Primary | ICD-10-CM

## 2019-09-23 DIAGNOSIS — E66.09 CLASS 1 OBESITY DUE TO EXCESS CALORIES WITHOUT SERIOUS COMORBIDITY WITH BODY MASS INDEX (BMI) OF 30.0 TO 30.9 IN ADULT: ICD-10-CM

## 2019-09-23 PROCEDURE — 99024 POSTOP FOLLOW-UP VISIT: CPT | Performed by: OBSTETRICS & GYNECOLOGY

## 2019-09-23 RX ORDER — PHENOL 1.4 %
1 AEROSOL, SPRAY (ML) MUCOUS MEMBRANE DAILY
COMMUNITY

## 2019-09-26 ENCOUNTER — OFFICE VISIT (OUTPATIENT)
Dept: FAMILY MEDICINE CLINIC | Age: 44
End: 2019-09-26
Payer: COMMERCIAL

## 2019-09-26 VITALS
WEIGHT: 160.6 LBS | HEART RATE: 82 BPM | OXYGEN SATURATION: 98 % | BODY MASS INDEX: 30.32 KG/M2 | TEMPERATURE: 98.8 F | SYSTOLIC BLOOD PRESSURE: 114 MMHG | DIASTOLIC BLOOD PRESSURE: 76 MMHG | HEIGHT: 61 IN | RESPIRATION RATE: 16 BRPM

## 2019-09-26 DIAGNOSIS — Z01.818 PREOP EXAMINATION: Primary | ICD-10-CM

## 2019-09-26 DIAGNOSIS — E78.2 HYPERLIPIDEMIA, MIXED: ICD-10-CM

## 2019-09-26 DIAGNOSIS — N92.6 ABNORMAL MENSTRUAL CYCLE: ICD-10-CM

## 2019-09-26 DIAGNOSIS — E66.09 CLASS 1 OBESITY DUE TO EXCESS CALORIES WITHOUT SERIOUS COMORBIDITY WITH BODY MASS INDEX (BMI) OF 30.0 TO 30.9 IN ADULT: ICD-10-CM

## 2019-09-26 PROCEDURE — 99243 OFF/OP CNSLTJ NEW/EST LOW 30: CPT | Performed by: FAMILY MEDICINE

## 2019-09-26 ASSESSMENT — ENCOUNTER SYMPTOMS
FACIAL SWELLING: 0
ABDOMINAL PAIN: 0
NAUSEA: 0
ABDOMINAL DISTENTION: 0
SINUS PRESSURE: 0
COUGH: 0
VOMITING: 0
RECTAL PAIN: 0
APNEA: 0
BLOOD IN STOOL: 0
BACK PAIN: 0
CHOKING: 0
WHEEZING: 0
CHEST TIGHTNESS: 0
COLOR CHANGE: 0
EYE PAIN: 0
SHORTNESS OF BREATH: 0
SORE THROAT: 0
DIARRHEA: 0
SINUS PAIN: 0
EYE DISCHARGE: 0
EYE ITCHING: 0
TROUBLE SWALLOWING: 0
EYE REDNESS: 0
VOICE CHANGE: 0
CONSTIPATION: 0
RHINORRHEA: 0
STRIDOR: 0
PHOTOPHOBIA: 0
ANAL BLEEDING: 0

## 2019-09-26 ASSESSMENT — PATIENT HEALTH QUESTIONNAIRE - PHQ9
SUM OF ALL RESPONSES TO PHQ QUESTIONS 1-9: 0
2. FEELING DOWN, DEPRESSED OR HOPELESS: 0
SUM OF ALL RESPONSES TO PHQ QUESTIONS 1-9: 0
SUM OF ALL RESPONSES TO PHQ9 QUESTIONS 1 & 2: 0
1. LITTLE INTEREST OR PLEASURE IN DOING THINGS: 0

## 2019-09-26 NOTE — LETTER
[address]  [address]  Phone: [phone]  Fax: [phone]    Emilia Hernández MD          September 26, 2019         Patient: Ayah Benitez   MR Number: [id number]   YOB: 1975   Date of Visit: 9/26/2019       Dear Benji Watson: Thank you for referring Ayah Benitez to me for a preoperative examination. Please find attached the history & physical examination from today's office visit. If you have any questions, please do not hesitate to call me.        Sincerely,                             Emilia Hernández MD

## 2019-09-26 NOTE — PROGRESS NOTES
Subjective:      Patient ID: Grabiel Garcia is a 40 y.o. female. HPI  Presenting for preoperative physical exam.   Surgeon/specialist requesting preop:VIDEO HYSTEROSCOPY, DILATATION AND CURETTAGE, NOVASURE ENDOMETRIAL ABLATION on 9/30/19 at [facility] with Dr. Karl Daniels, DO  Elective procedure:Yes   General anesthesia(GA). Prior GA: yes w/o complications. GA complications:No.  Prior local anesthesia(LA):yes w/o complications. Exercise capacity:Walks. . Climbs flight of stairs w/o inappropriate sob. Cardio 2-3xweek. Excellent/good functional capacity:yes. Preop paperwork from surgeon/specialist for completion:none brought in today. Labs/test requested from surgeon/specialist: no. Has pended preop orders in chart from specialist:cbc/bmp/urine pregnancy. Denies cp/sob/dizziness/pnd/palpitations. Flu vaccine:plans to receive at work soon. Immunization History   Administered Date(s) Administered    DTP 1975, 01/30/1976, 04/13/1976, 03/31/1977, 09/30/1981    Hepatitis B 01/17/2007, 01/07/2008, 05/30/2008    Influenza Vaccine, unspecified formulation 10/01/2016    Influenza Virus Vaccine 10/11/2013, 10/03/2017, 10/12/2018    MMR 09/21/1976    Polio IPV (IPOL) 1975, 01/30/1976, 04/13/1976, 03/31/1977, 09/30/1981    Td, unspecified formulation 12/12/2007       No Known Allergies    Current Outpatient Medications on File Prior to Visit   Medication Sig Dispense Refill    calcium carbonate 600 MG TABS tablet Take 1 tablet by mouth daily      Multiple Vitamins-Minerals (THERAPEUTIC MULTIVITAMIN-MINERALS) tablet Take 1 tablet by mouth daily      Omega-3 Fatty Acids (FISH OIL PO) Take by mouth daily       No current facility-administered medications on file prior to visit.       Past Medical History:   Diagnosis Date    Encounter for Essure implantation 3/2015    Per gyn    Herpes labialis 4/3/2014    Hyperlipidemia 2/5/2014    Normal delivery     Term    Obese 1/24/2014

## 2019-09-27 ENCOUNTER — ANESTHESIA EVENT (OUTPATIENT)
Dept: OPERATING ROOM | Age: 44
End: 2019-09-27
Payer: COMMERCIAL

## 2019-09-30 ENCOUNTER — ANESTHESIA (OUTPATIENT)
Dept: OPERATING ROOM | Age: 44
End: 2019-09-30
Payer: COMMERCIAL

## 2019-09-30 ENCOUNTER — HOSPITAL ENCOUNTER (OUTPATIENT)
Age: 44
Setting detail: OUTPATIENT SURGERY
Discharge: HOME | End: 2019-09-30
Attending: OBSTETRICS & GYNECOLOGY | Admitting: OBSTETRICS & GYNECOLOGY
Payer: COMMERCIAL

## 2019-09-30 VITALS
OXYGEN SATURATION: 99 % | DIASTOLIC BLOOD PRESSURE: 63 MMHG | RESPIRATION RATE: 9 BRPM | SYSTOLIC BLOOD PRESSURE: 120 MMHG

## 2019-09-30 VITALS
TEMPERATURE: 97.1 F | DIASTOLIC BLOOD PRESSURE: 85 MMHG | SYSTOLIC BLOOD PRESSURE: 141 MMHG | OXYGEN SATURATION: 99 % | BODY MASS INDEX: 29.27 KG/M2 | RESPIRATION RATE: 18 BRPM | WEIGHT: 155 LBS | HEART RATE: 65 BPM | HEIGHT: 61 IN

## 2019-09-30 DIAGNOSIS — N92.0 MENORRHAGIA WITH REGULAR CYCLE: ICD-10-CM

## 2019-09-30 DIAGNOSIS — Z98.890 S/P D&C (STATUS POST DILATION AND CURETTAGE): Primary | ICD-10-CM

## 2019-09-30 LAB
ANION GAP SERPL CALCULATED.3IONS-SCNC: 12 MMOL/L (ref 3–16)
BASOPHILS ABSOLUTE: 0 K/UL (ref 0–0.2)
BASOPHILS RELATIVE PERCENT: 0.5 %
BUN BLDV-MCNC: 14 MG/DL (ref 7–20)
CALCIUM SERPL-MCNC: 9.1 MG/DL (ref 8.3–10.6)
CHLORIDE BLD-SCNC: 105 MMOL/L (ref 99–110)
CO2: 23 MMOL/L (ref 21–32)
CREAT SERPL-MCNC: 0.8 MG/DL (ref 0.6–1.1)
EOSINOPHILS ABSOLUTE: 0.3 K/UL (ref 0–0.6)
EOSINOPHILS RELATIVE PERCENT: 3.3 %
GFR AFRICAN AMERICAN: >60
GFR NON-AFRICAN AMERICAN: >60
GLUCOSE BLD-MCNC: 100 MG/DL (ref 70–99)
HCT VFR BLD CALC: 40.3 % (ref 36–48)
HEMOGLOBIN: 13.6 G/DL (ref 12–16)
LYMPHOCYTES ABSOLUTE: 3.1 K/UL (ref 1–5.1)
LYMPHOCYTES RELATIVE PERCENT: 34.8 %
MCH RBC QN AUTO: 30.4 PG (ref 26–34)
MCHC RBC AUTO-ENTMCNC: 33.8 G/DL (ref 31–36)
MCV RBC AUTO: 90.2 FL (ref 80–100)
MONOCYTES ABSOLUTE: 0.7 K/UL (ref 0–1.3)
MONOCYTES RELATIVE PERCENT: 8.2 %
NEUTROPHILS ABSOLUTE: 4.7 K/UL (ref 1.7–7.7)
NEUTROPHILS RELATIVE PERCENT: 53.2 %
PDW BLD-RTO: 13.9 % (ref 12.4–15.4)
PLATELET # BLD: 246 K/UL (ref 135–450)
PMV BLD AUTO: 8.6 FL (ref 5–10.5)
POTASSIUM SERPL-SCNC: 3.8 MMOL/L (ref 3.5–5.1)
PREGNANCY, URINE: NEGATIVE
RBC # BLD: 4.47 M/UL (ref 4–5.2)
SODIUM BLD-SCNC: 140 MMOL/L (ref 136–145)
WBC # BLD: 8.8 K/UL (ref 4–11)

## 2019-09-30 PROCEDURE — 7100000000 HC PACU RECOVERY - FIRST 15 MIN: Performed by: OBSTETRICS & GYNECOLOGY

## 2019-09-30 PROCEDURE — 7100000001 HC PACU RECOVERY - ADDTL 15 MIN: Performed by: OBSTETRICS & GYNECOLOGY

## 2019-09-30 PROCEDURE — 3600000014 HC SURGERY LEVEL 4 ADDTL 15MIN: Performed by: OBSTETRICS & GYNECOLOGY

## 2019-09-30 PROCEDURE — 2500000003 HC RX 250 WO HCPCS: Performed by: ANESTHESIOLOGY

## 2019-09-30 PROCEDURE — 2709999900 HC NON-CHARGEABLE SUPPLY: Performed by: OBSTETRICS & GYNECOLOGY

## 2019-09-30 PROCEDURE — 7100000011 HC PHASE II RECOVERY - ADDTL 15 MIN: Performed by: OBSTETRICS & GYNECOLOGY

## 2019-09-30 PROCEDURE — 7100000010 HC PHASE II RECOVERY - FIRST 15 MIN: Performed by: OBSTETRICS & GYNECOLOGY

## 2019-09-30 PROCEDURE — 80048 BASIC METABOLIC PNL TOTAL CA: CPT

## 2019-09-30 PROCEDURE — 2720000010 HC SURG SUPPLY STERILE: Performed by: OBSTETRICS & GYNECOLOGY

## 2019-09-30 PROCEDURE — 6360000002 HC RX W HCPCS: Performed by: NURSE ANESTHETIST, CERTIFIED REGISTERED

## 2019-09-30 PROCEDURE — 3700000000 HC ANESTHESIA ATTENDED CARE: Performed by: OBSTETRICS & GYNECOLOGY

## 2019-09-30 PROCEDURE — 2580000003 HC RX 258: Performed by: OBSTETRICS & GYNECOLOGY

## 2019-09-30 PROCEDURE — 85025 COMPLETE CBC W/AUTO DIFF WBC: CPT

## 2019-09-30 PROCEDURE — 3600000004 HC SURGERY LEVEL 4 BASE: Performed by: OBSTETRICS & GYNECOLOGY

## 2019-09-30 PROCEDURE — 3700000001 HC ADD 15 MINUTES (ANESTHESIA): Performed by: OBSTETRICS & GYNECOLOGY

## 2019-09-30 PROCEDURE — 84703 CHORIONIC GONADOTROPIN ASSAY: CPT

## 2019-09-30 PROCEDURE — 58563 HYSTEROSCOPY ABLATION: CPT | Performed by: OBSTETRICS & GYNECOLOGY

## 2019-09-30 PROCEDURE — 2500000003 HC RX 250 WO HCPCS: Performed by: NURSE ANESTHETIST, CERTIFIED REGISTERED

## 2019-09-30 PROCEDURE — 88305 TISSUE EXAM BY PATHOLOGIST: CPT

## 2019-09-30 PROCEDURE — 2580000003 HC RX 258: Performed by: ANESTHESIOLOGY

## 2019-09-30 RX ORDER — ONDANSETRON 2 MG/ML
4 INJECTION INTRAMUSCULAR; INTRAVENOUS PRN
Status: DISCONTINUED | OUTPATIENT
Start: 2019-09-30 | End: 2019-09-30 | Stop reason: HOSPADM

## 2019-09-30 RX ORDER — FENTANYL CITRATE 50 UG/ML
INJECTION, SOLUTION INTRAMUSCULAR; INTRAVENOUS PRN
Status: DISCONTINUED | OUTPATIENT
Start: 2019-09-30 | End: 2019-09-30 | Stop reason: SDUPTHER

## 2019-09-30 RX ORDER — MEPERIDINE HYDROCHLORIDE 50 MG/ML
12.5 INJECTION INTRAMUSCULAR; INTRAVENOUS; SUBCUTANEOUS EVERY 5 MIN PRN
Status: DISCONTINUED | OUTPATIENT
Start: 2019-09-30 | End: 2019-09-30 | Stop reason: HOSPADM

## 2019-09-30 RX ORDER — IBUPROFEN 800 MG/1
800 TABLET ORAL EVERY 8 HOURS PRN
Qty: 30 TABLET | Refills: 0 | Status: SHIPPED | OUTPATIENT
Start: 2019-09-30 | End: 2022-05-06 | Stop reason: ALTCHOICE

## 2019-09-30 RX ORDER — PROPOFOL 10 MG/ML
INJECTION, EMULSION INTRAVENOUS PRN
Status: DISCONTINUED | OUTPATIENT
Start: 2019-09-30 | End: 2019-09-30 | Stop reason: SDUPTHER

## 2019-09-30 RX ORDER — LIDOCAINE HYDROCHLORIDE 20 MG/ML
INJECTION, SOLUTION INFILTRATION; PERINEURAL PRN
Status: DISCONTINUED | OUTPATIENT
Start: 2019-09-30 | End: 2019-09-30 | Stop reason: SDUPTHER

## 2019-09-30 RX ORDER — PROMETHAZINE HYDROCHLORIDE 25 MG/ML
6.25 INJECTION, SOLUTION INTRAMUSCULAR; INTRAVENOUS
Status: DISCONTINUED | OUTPATIENT
Start: 2019-09-30 | End: 2019-09-30 | Stop reason: HOSPADM

## 2019-09-30 RX ORDER — KETOROLAC TROMETHAMINE 30 MG/ML
INJECTION, SOLUTION INTRAMUSCULAR; INTRAVENOUS PRN
Status: DISCONTINUED | OUTPATIENT
Start: 2019-09-30 | End: 2019-09-30 | Stop reason: SDUPTHER

## 2019-09-30 RX ORDER — HYDROCODONE BITARTRATE AND ACETAMINOPHEN 5; 325 MG/1; MG/1
1 TABLET ORAL EVERY 8 HOURS PRN
Qty: 20 TABLET | Refills: 0 | Status: SHIPPED | OUTPATIENT
Start: 2019-09-30 | End: 2019-10-05

## 2019-09-30 RX ORDER — HYDRALAZINE HYDROCHLORIDE 20 MG/ML
5 INJECTION INTRAMUSCULAR; INTRAVENOUS EVERY 10 MIN PRN
Status: DISCONTINUED | OUTPATIENT
Start: 2019-09-30 | End: 2019-09-30 | Stop reason: HOSPADM

## 2019-09-30 RX ORDER — LABETALOL HYDROCHLORIDE 5 MG/ML
5 INJECTION, SOLUTION INTRAVENOUS EVERY 10 MIN PRN
Status: DISCONTINUED | OUTPATIENT
Start: 2019-09-30 | End: 2019-09-30 | Stop reason: HOSPADM

## 2019-09-30 RX ORDER — SODIUM CHLORIDE, SODIUM LACTATE, POTASSIUM CHLORIDE, AND CALCIUM CHLORIDE .6; .31; .03; .02 G/100ML; G/100ML; G/100ML; G/100ML
IRRIGANT IRRIGATION PRN
Status: DISCONTINUED | OUTPATIENT
Start: 2019-09-30 | End: 2019-09-30 | Stop reason: ALTCHOICE

## 2019-09-30 RX ORDER — DIPHENHYDRAMINE HYDROCHLORIDE 50 MG/ML
12.5 INJECTION INTRAMUSCULAR; INTRAVENOUS
Status: DISCONTINUED | OUTPATIENT
Start: 2019-09-30 | End: 2019-09-30 | Stop reason: HOSPADM

## 2019-09-30 RX ORDER — OXYCODONE HYDROCHLORIDE AND ACETAMINOPHEN 5; 325 MG/1; MG/1
1 TABLET ORAL PRN
Status: DISCONTINUED | OUTPATIENT
Start: 2019-09-30 | End: 2019-09-30 | Stop reason: HOSPADM

## 2019-09-30 RX ORDER — MIDAZOLAM HYDROCHLORIDE 1 MG/ML
INJECTION INTRAMUSCULAR; INTRAVENOUS PRN
Status: DISCONTINUED | OUTPATIENT
Start: 2019-09-30 | End: 2019-09-30 | Stop reason: SDUPTHER

## 2019-09-30 RX ORDER — MORPHINE SULFATE 2 MG/ML
2 INJECTION, SOLUTION INTRAMUSCULAR; INTRAVENOUS EVERY 5 MIN PRN
Status: DISCONTINUED | OUTPATIENT
Start: 2019-09-30 | End: 2019-09-30 | Stop reason: HOSPADM

## 2019-09-30 RX ORDER — OXYCODONE HYDROCHLORIDE AND ACETAMINOPHEN 5; 325 MG/1; MG/1
2 TABLET ORAL PRN
Status: DISCONTINUED | OUTPATIENT
Start: 2019-09-30 | End: 2019-09-30 | Stop reason: HOSPADM

## 2019-09-30 RX ORDER — MAGNESIUM HYDROXIDE 1200 MG/15ML
LIQUID ORAL CONTINUOUS PRN
Status: COMPLETED | OUTPATIENT
Start: 2019-09-30 | End: 2019-09-30

## 2019-09-30 RX ORDER — SODIUM CHLORIDE 0.9 % (FLUSH) 0.9 %
10 SYRINGE (ML) INJECTION PRN
Status: DISCONTINUED | OUTPATIENT
Start: 2019-09-30 | End: 2019-09-30 | Stop reason: HOSPADM

## 2019-09-30 RX ORDER — SODIUM CHLORIDE, SODIUM LACTATE, POTASSIUM CHLORIDE, CALCIUM CHLORIDE 600; 310; 30; 20 MG/100ML; MG/100ML; MG/100ML; MG/100ML
INJECTION, SOLUTION INTRAVENOUS CONTINUOUS
Status: DISCONTINUED | OUTPATIENT
Start: 2019-09-30 | End: 2019-09-30 | Stop reason: HOSPADM

## 2019-09-30 RX ORDER — DEXAMETHASONE SODIUM PHOSPHATE 4 MG/ML
INJECTION, SOLUTION INTRA-ARTICULAR; INTRALESIONAL; INTRAMUSCULAR; INTRAVENOUS; SOFT TISSUE PRN
Status: DISCONTINUED | OUTPATIENT
Start: 2019-09-30 | End: 2019-09-30 | Stop reason: SDUPTHER

## 2019-09-30 RX ORDER — ONDANSETRON 2 MG/ML
INJECTION INTRAMUSCULAR; INTRAVENOUS PRN
Status: DISCONTINUED | OUTPATIENT
Start: 2019-09-30 | End: 2019-09-30 | Stop reason: SDUPTHER

## 2019-09-30 RX ORDER — MORPHINE SULFATE 2 MG/ML
1 INJECTION, SOLUTION INTRAMUSCULAR; INTRAVENOUS EVERY 5 MIN PRN
Status: DISCONTINUED | OUTPATIENT
Start: 2019-09-30 | End: 2019-09-30 | Stop reason: HOSPADM

## 2019-09-30 RX ORDER — SODIUM CHLORIDE 0.9 % (FLUSH) 0.9 %
10 SYRINGE (ML) INJECTION EVERY 12 HOURS SCHEDULED
Status: DISCONTINUED | OUTPATIENT
Start: 2019-09-30 | End: 2019-09-30 | Stop reason: HOSPADM

## 2019-09-30 RX ADMIN — KETOROLAC TROMETHAMINE 30 MG: 30 INJECTION, SOLUTION INTRAMUSCULAR; INTRAVENOUS at 08:02

## 2019-09-30 RX ADMIN — ONDANSETRON 4 MG: 2 INJECTION INTRAMUSCULAR; INTRAVENOUS at 07:33

## 2019-09-30 RX ADMIN — FAMOTIDINE 20 MG: 10 INJECTION, SOLUTION INTRAVENOUS at 06:49

## 2019-09-30 RX ADMIN — SODIUM CHLORIDE, POTASSIUM CHLORIDE, SODIUM LACTATE AND CALCIUM CHLORIDE: 600; 310; 30; 20 INJECTION, SOLUTION INTRAVENOUS at 06:38

## 2019-09-30 RX ADMIN — PROPOFOL 200 MG: 10 INJECTION, EMULSION INTRAVENOUS at 07:29

## 2019-09-30 RX ADMIN — LIDOCAINE HYDROCHLORIDE 60 MG: 20 INJECTION, SOLUTION INFILTRATION; PERINEURAL at 07:29

## 2019-09-30 RX ADMIN — FENTANYL CITRATE 50 MCG: 50 INJECTION INTRAMUSCULAR; INTRAVENOUS at 07:29

## 2019-09-30 RX ADMIN — DEXAMETHASONE SODIUM PHOSPHATE 6 MG: 4 INJECTION, SOLUTION INTRAMUSCULAR; INTRAVENOUS at 07:33

## 2019-09-30 RX ADMIN — MIDAZOLAM HYDROCHLORIDE 2 MG: 2 INJECTION, SOLUTION INTRAMUSCULAR; INTRAVENOUS at 07:25

## 2019-09-30 ASSESSMENT — PULMONARY FUNCTION TESTS
PIF_VALUE: 2
PIF_VALUE: 1
PIF_VALUE: 16
PIF_VALUE: 19
PIF_VALUE: 16
PIF_VALUE: 12
PIF_VALUE: 15
PIF_VALUE: 16
PIF_VALUE: 2
PIF_VALUE: 14
PIF_VALUE: 1
PIF_VALUE: 27
PIF_VALUE: 27
PIF_VALUE: 11
PIF_VALUE: 14
PIF_VALUE: 14
PIF_VALUE: 20
PIF_VALUE: 2
PIF_VALUE: 18
PIF_VALUE: 2
PIF_VALUE: 15
PIF_VALUE: 9
PIF_VALUE: 14
PIF_VALUE: 2
PIF_VALUE: 3
PIF_VALUE: 1
PIF_VALUE: 0
PIF_VALUE: 15
PIF_VALUE: 13
PIF_VALUE: 13
PIF_VALUE: 5
PIF_VALUE: 21
PIF_VALUE: 12
PIF_VALUE: 17
PIF_VALUE: 2
PIF_VALUE: 0
PIF_VALUE: 12
PIF_VALUE: 14
PIF_VALUE: 2
PIF_VALUE: 15

## 2019-09-30 ASSESSMENT — PAIN SCALES - GENERAL
PAINLEVEL_OUTOF10: 0

## 2019-09-30 ASSESSMENT — PAIN - FUNCTIONAL ASSESSMENT: PAIN_FUNCTIONAL_ASSESSMENT: 0-10

## 2019-09-30 NOTE — ANESTHESIA PRE PROCEDURE
Used   Substance Use Topics    Alcohol use: Yes     Comment: once a month                                 Counseling given: Not Answered      Vital Signs (Current):   Vitals:    09/25/19 0850   Weight: 155 lb (70.3 kg)   Height: 5' 1\" (1.549 m)                                              BP Readings from Last 3 Encounters:   09/26/19 114/76   09/23/19 112/74   06/17/19 122/64       NPO Status:                                                                                 BMI:   Wt Readings from Last 3 Encounters:   09/26/19 160 lb 9.6 oz (72.8 kg)   09/23/19 159 lb 3.2 oz (72.2 kg)   06/17/19 147 lb (66.7 kg)     Body mass index is 29.29 kg/m². CBC:   Lab Results   Component Value Date    WBC 9.3 04/12/2019    RBC 4.56 04/12/2019    HGB 13.9 04/12/2019    HCT 41.8 04/12/2019    MCV 91.6 04/12/2019    RDW 13.9 04/12/2019     04/12/2019       CMP:   Lab Results   Component Value Date     04/12/2019    K 3.8 04/12/2019     04/12/2019    CO2 21 04/12/2019    BUN 12 04/12/2019    CREATININE 0.7 04/12/2019    GFRAA >60 04/12/2019    AGRATIO 1.1 04/12/2019    LABGLOM >60 04/12/2019    LABGLOM 85 01/27/2014    GLUCOSE 92 05/20/2019    PROT 8.1 04/12/2019    CALCIUM 9.5 04/12/2019    BILITOT 0.3 04/12/2019    ALKPHOS 54 04/12/2019    AST 22 04/12/2019    ALT 15 04/12/2019       POC Tests: No results for input(s): POCGLU, POCNA, POCK, POCCL, POCBUN, POCHEMO, POCHCT in the last 72 hours.     Coags: No results found for: PROTIME, INR, APTT    HCG (If Applicable):   Lab Results   Component Value Date    PREGTESTUR negative 05/29/2019        ABGs: No results found for: PHART, PO2ART, BHM1OZU, YEZ1PDH, BEART, G4QUKQHS     Type & Screen (If Applicable):  No results found for: LABABO, [address]    Anesthesia Evaluation  Patient summary reviewed and Nursing notes reviewed no history of anesthetic complications:   Airway: Mallampati: II     Neck ROM: full   Dental:          Pulmonary:Negative Pulmonary ROS and

## 2019-09-30 NOTE — ANESTHESIA POSTPROCEDURE EVALUATION
09/30/2019 06:32 AM   RENAL  Lab Results       Component                Value               Date/Time                  NA                       140                 09/30/2019 06:30 AM        K                        3.8                 09/30/2019 06:30 AM        CL                       105                 09/30/2019 06:30 AM        CO2                      23                  09/30/2019 06:30 AM        BUN                      14                  09/30/2019 06:30 AM        CREATININE               0.8                 09/30/2019 06:30 AM        GLUCOSE                  100 (H)             09/30/2019 06:30 AM   COAGS  No results found for: PROTIME, INR, APTT    Intake & Output:  @96ZDAT@    Nausea & Vomiting:  No    Level of Consciousness:  Awake    Pain Assessment:  Adequate analgesia    Anesthesia Complications:  No apparent anesthetic complications    SUMMARY      Vital signs stable  OK to discharge from Stage I post anesthesia care.   Care transferred from Anesthesiology department on discharge from perioperative area

## 2019-10-07 ENCOUNTER — OFFICE VISIT (OUTPATIENT)
Dept: OBGYN CLINIC | Age: 44
End: 2019-10-07

## 2019-10-07 VITALS
WEIGHT: 159.4 LBS | HEART RATE: 84 BPM | TEMPERATURE: 98.2 F | BODY MASS INDEX: 30.12 KG/M2 | DIASTOLIC BLOOD PRESSURE: 68 MMHG | SYSTOLIC BLOOD PRESSURE: 114 MMHG

## 2019-10-07 DIAGNOSIS — N92.0 MENORRHAGIA WITH REGULAR CYCLE: Primary | ICD-10-CM

## 2019-10-07 DIAGNOSIS — Z98.890 S/P D&C (STATUS POST DILATION AND CURETTAGE): ICD-10-CM

## 2019-10-07 DIAGNOSIS — Z98.890 S/P ENDOMETRIAL ABLATION: ICD-10-CM

## 2019-10-07 PROCEDURE — 99024 POSTOP FOLLOW-UP VISIT: CPT | Performed by: OBSTETRICS & GYNECOLOGY

## 2021-08-05 LAB
CHOLESTEROL, TOTAL: 210 MG/DL (ref 0–199)
GLUCOSE BLD-MCNC: 98 MG/DL (ref 70–99)
HDLC SERPL-MCNC: 38 MG/DL (ref 40–60)
LDL CHOLESTEROL CALCULATED: 147 MG/DL
TRIGL SERPL-MCNC: 127 MG/DL (ref 0–150)

## 2022-05-02 ENCOUNTER — HOSPITAL ENCOUNTER (OUTPATIENT)
Dept: MAMMOGRAPHY | Age: 47
Discharge: HOME | End: 2022-05-02
Payer: COMMERCIAL

## 2022-05-02 VITALS — WEIGHT: 172 LBS | HEIGHT: 61 IN | BODY MASS INDEX: 32.47 KG/M2

## 2022-05-02 DIAGNOSIS — Z12.31 VISIT FOR SCREENING MAMMOGRAM: ICD-10-CM

## 2022-05-02 PROCEDURE — 77063 BREAST TOMOSYNTHESIS BI: CPT

## 2022-05-06 ENCOUNTER — OFFICE VISIT (OUTPATIENT)
Dept: PRIMARY CARE CLINIC | Age: 47
End: 2022-05-06
Payer: COMMERCIAL

## 2022-05-06 VITALS
BODY MASS INDEX: 32.39 KG/M2 | HEART RATE: 50 BPM | DIASTOLIC BLOOD PRESSURE: 72 MMHG | HEIGHT: 62 IN | OXYGEN SATURATION: 98 % | TEMPERATURE: 98.4 F | SYSTOLIC BLOOD PRESSURE: 108 MMHG | WEIGHT: 176 LBS

## 2022-05-06 DIAGNOSIS — Z00.00 ANNUAL PHYSICAL EXAM: Primary | ICD-10-CM

## 2022-05-06 DIAGNOSIS — Z12.11 SCREENING FOR COLON CANCER: ICD-10-CM

## 2022-05-06 PROCEDURE — 99386 PREV VISIT NEW AGE 40-64: CPT | Performed by: FAMILY MEDICINE

## 2022-05-06 SDOH — ECONOMIC STABILITY: FOOD INSECURITY: WITHIN THE PAST 12 MONTHS, THE FOOD YOU BOUGHT JUST DIDN'T LAST AND YOU DIDN'T HAVE MONEY TO GET MORE.: NEVER TRUE

## 2022-05-06 SDOH — ECONOMIC STABILITY: FOOD INSECURITY: WITHIN THE PAST 12 MONTHS, YOU WORRIED THAT YOUR FOOD WOULD RUN OUT BEFORE YOU GOT MONEY TO BUY MORE.: NEVER TRUE

## 2022-05-06 ASSESSMENT — PATIENT HEALTH QUESTIONNAIRE - PHQ9
SUM OF ALL RESPONSES TO PHQ QUESTIONS 1-9: 0
1. LITTLE INTEREST OR PLEASURE IN DOING THINGS: 0
SUM OF ALL RESPONSES TO PHQ QUESTIONS 1-9: 0
2. FEELING DOWN, DEPRESSED OR HOPELESS: 0
SUM OF ALL RESPONSES TO PHQ9 QUESTIONS 1 & 2: 0

## 2022-05-06 ASSESSMENT — ENCOUNTER SYMPTOMS
ABDOMINAL PAIN: 0
COUGH: 0
SORE THROAT: 0
SHORTNESS OF BREATH: 0
NAUSEA: 0

## 2022-05-06 ASSESSMENT — SOCIAL DETERMINANTS OF HEALTH (SDOH): HOW HARD IS IT FOR YOU TO PAY FOR THE VERY BASICS LIKE FOOD, HOUSING, MEDICAL CARE, AND HEATING?: NOT HARD AT ALL

## 2022-05-06 NOTE — Clinical Note
Flory Christine, I put in a colonoscopy referral to you for Tiffany. 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 Hope this is ok in the current landscape, haha. Let me know!  Evelyne Dumont

## 2022-05-06 NOTE — PROGRESS NOTES
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 She does not have any acute concerns. Chart review done together. PHQ Scores 2022   PHQ2 Score 0 0 0 0   PHQ9 Score 0 0 0 0     Interpretation of Total Score Depression Severity: 1-4 = Minimal depression, 5-9 = Mild depression, 10-14 = Moderate depression, 15-19 = Moderately severe depression, 20-27 = Severe depression     Prior to Visit Medications    Medication Sig Taking? Authorizing Provider   calcium carbonate 600 MG TABS tablet Take 1 tablet by mouth daily Yes Historical Provider, MD   Multiple Vitamins-Minerals (THERAPEUTIC MULTIVITAMIN-MINERALS) tablet Take 1 tablet by mouth daily Yes Historical Provider, MD   Omega-3 Fatty Acids (FISH OIL PO) Take by mouth daily Yes Historical Provider, MD       History reviewed. No pertinent past medical history.      Social History     Tobacco Use    Smoking status: Never Smoker    Smokeless tobacco: Never Used   Vaping Use    Vaping Use: Never used   Substance Use Topics    Alcohol use: Yes     Comment: once a month     Drug use: No        Past Surgical History:   Procedure Laterality Date     SECTION      x 2    DILATION AND CURETTAGE OF UTERUS N/A 2019    VIDEO HYSTEROSCOPY, DILATATION AND CURETTAGE, NOVASURE ENDOMETRIAL ABLATION performed by Isaac Miguel DO at [facility]      Essure    WISDOM TOOTH EXTRACTION          No Known Allergies     Family History   Problem Relation Age of Onset    Depression Mother     Heart Attack Father     Heart Disease Father     Cancer Maternal Grandmother 79        breast    Cancer Maternal Grandfather         non-hodgkins    Diabetes Paternal Grandmother     Cancer Paternal Grandfather         lung    No Known Problems Sister         Patient's past medical history, surgical history, family history, medications, and allergies  were all reviewed and updated as appropriate today. Review of Systems   Constitutional: Negative for fatigue, fever and unexpected weight change. HENT: Negative for congestion, ear pain and sore throat. Eyes: Negative for pain, itching and visual disturbance. Respiratory: Negative for cough, shortness of breath and wheezing. Cardiovascular: Negative for chest pain, palpitations and leg swelling. Gastrointestinal: Negative for abdominal pain, constipation, diarrhea, nausea and vomiting. Endocrine: Negative for cold intolerance, heat intolerance, polydipsia and polyuria. Genitourinary: Negative for dysuria, frequency and hematuria. Musculoskeletal: Negative for arthralgias and joint swelling. Skin: Negative for rash. Neurological: Negative for dizziness and headaches. Hematological: Negative for adenopathy. /72   Pulse 50   Temp 98.4 °F (36.9 °C)   Ht 5' 2\" (1.575 m)   Wt 176 lb (79.8 kg)   LMP 04/10/2022 (Exact Date)   SpO2 98%   BMI 32.19 kg/m²      Physical Exam  Vitals reviewed. Constitutional:       General: She is not in acute distress. Appearance: Normal appearance. She is well-developed and normal weight. HENT:      Head: Normocephalic and atraumatic. Right Ear: Tympanic membrane and ear canal normal. No drainage. No middle ear effusion. Tympanic membrane is not erythematous. Left Ear: Tympanic membrane and ear canal normal. No drainage. No middle ear effusion. Tympanic membrane is not erythematous. Nose: Nose normal. No rhinorrhea. Mouth/Throat:      Mouth: Mucous membranes are moist.      Pharynx: No oropharyngeal exudate or posterior oropharyngeal erythema. Eyes:      Extraocular Movements: Extraocular movements intact.       Pupils: Pupils are equal, round, and reactive to light. Neck:      Thyroid: No thyromegaly. Cardiovascular:      Rate and Rhythm: Normal rate and regular rhythm. Heart sounds: No murmur heard. Pulmonary:      Effort: Pulmonary effort is normal.      Breath sounds: Normal breath sounds. No wheezing. Abdominal:      General: Bowel sounds are normal.      Palpations: Abdomen is soft. There is no mass. Tenderness: There is no abdominal tenderness. Musculoskeletal:         General: No swelling or deformity. Normal range of motion. Cervical back: Neck supple. Lymphadenopathy:      Cervical: No cervical adenopathy. Skin:     General: Skin is warm and dry. Findings: No rash. Neurological:      General: No focal deficit present. Mental Status: She is alert and oriented to person, place, and time. Cranial Nerves: No cranial nerve deficit. Psychiatric:         Mood and Affect: Mood normal.         Behavior: Behavior is cooperative. Assessment:  Encounter Diagnoses   Name Primary?  Annual physical exam Yes    Screening for colon cancer        Plan:  1. Annual physical exam  General wellness exam. Reviewed chart for past hx and updated today. Counseled on age appropriate health guidance and discussed screening recommendations. Vaccinations reviewed and discussed. All questions answered  - Be Well Health Screen; Future  - CBC with Auto Differential; Future  - Comprehensive Metabolic Panel; Future  - TSH with Reflex; Future    2. Screening for colon cancer  Discussed colon cancer screening options relating to patient's personal and family risk. Patient is interested in getting a referral for colonoscopy. This information has been provided and he is to call to get that visit set up.   - Luís Richardson MD, Colorectal Surgery, [facility]      Return in about 1 year (around 5/6/2023) for Annual Physical.               Fidel Dai, DO     Please note that this chart was generated using dragon dictation software. Although every effort was made to ensure the accuracy of this automated transcription, some errors in transcription may have occurred.

## 2022-05-08 ASSESSMENT — ENCOUNTER SYMPTOMS
DIARRHEA: 0
EYE ITCHING: 0
WHEEZING: 0
VOMITING: 0
EYE PAIN: 0
CONSTIPATION: 0

## 2022-05-10 ENCOUNTER — TELEPHONE (OUTPATIENT)
Dept: SURGERY | Age: 47
End: 2022-05-10

## 2022-05-10 RX ORDER — POLYETHYLENE GLYCOL 3350, SODIUM CHLORIDE, SODIUM BICARBONATE, POTASSIUM CHLORIDE 420; 11.2; 5.72; 1.48 G/4L; G/4L; G/4L; G/4L
POWDER, FOR SOLUTION ORAL
Qty: 1 EACH | Refills: 0 | Status: SHIPPED | OUTPATIENT
Start: 2022-05-10 | End: 2022-06-26

## 2022-05-10 NOTE — TELEPHONE ENCOUNTER
Patient has been scheduled for:    Procedure: Colonoscopy   Date: 6/22/22   Time: 9:00AM  Arrival: 7:30AM  [facility]  [facility]?: N/A  Prep? Golytely    Pre-op? N/A    Post-op Appt? N/A     Patient advised they will need a . Orders routed to surgery scheduling. Instructions have been mailed/emailed to:  [email or web address]

## 2022-05-12 DIAGNOSIS — Z00.00 ANNUAL PHYSICAL EXAM: ICD-10-CM

## 2022-05-12 LAB
A/G RATIO: 1.4 (ref 1.1–2.2)
ALBUMIN SERPL-MCNC: 4.2 G/DL (ref 3.4–5)
ALP BLD-CCNC: 75 U/L (ref 40–129)
ALT SERPL-CCNC: 17 U/L (ref 10–40)
ANION GAP SERPL CALCULATED.3IONS-SCNC: 14 MMOL/L (ref 3–16)
AST SERPL-CCNC: 21 U/L (ref 15–37)
BASOPHILS ABSOLUTE: 0 K/UL (ref 0–0.2)
BASOPHILS RELATIVE PERCENT: 0.4 %
BILIRUB SERPL-MCNC: 0.3 MG/DL (ref 0–1)
BUN BLDV-MCNC: 14 MG/DL (ref 7–20)
CALCIUM SERPL-MCNC: 9.2 MG/DL (ref 8.3–10.6)
CHLORIDE BLD-SCNC: 103 MMOL/L (ref 99–110)
CHOLESTEROL, TOTAL: 217 MG/DL (ref 0–199)
CO2: 21 MMOL/L (ref 21–32)
CREAT SERPL-MCNC: 0.8 MG/DL (ref 0.6–1.1)
EOSINOPHILS ABSOLUTE: 0.2 K/UL (ref 0–0.6)
EOSINOPHILS RELATIVE PERCENT: 2.1 %
GFR AFRICAN AMERICAN: >60
GFR NON-AFRICAN AMERICAN: >60
GLUCOSE BLD-MCNC: 97 MG/DL (ref 70–99)
HCT VFR BLD CALC: 34.9 % (ref 36–48)
HDLC SERPL-MCNC: 40 MG/DL (ref 40–60)
HEMOGLOBIN: 11.4 G/DL (ref 12–16)
LDL CHOLESTEROL CALCULATED: 141 MG/DL
LYMPHOCYTES ABSOLUTE: 3 K/UL (ref 1–5.1)
LYMPHOCYTES RELATIVE PERCENT: 35.9 %
MCH RBC QN AUTO: 24.7 PG (ref 26–34)
MCHC RBC AUTO-ENTMCNC: 32.7 G/DL (ref 31–36)
MCV RBC AUTO: 75.7 FL (ref 80–100)
MONOCYTES ABSOLUTE: 0.6 K/UL (ref 0–1.3)
MONOCYTES RELATIVE PERCENT: 7.5 %
NEUTROPHILS ABSOLUTE: 4.5 K/UL (ref 1.7–7.7)
NEUTROPHILS RELATIVE PERCENT: 54.1 %
PDW BLD-RTO: 18 % (ref 12.4–15.4)
PLATELET # BLD: 282 K/UL (ref 135–450)
PMV BLD AUTO: 8.5 FL (ref 5–10.5)
POTASSIUM SERPL-SCNC: 4.2 MMOL/L (ref 3.5–5.1)
RBC # BLD: 4.6 M/UL (ref 4–5.2)
SODIUM BLD-SCNC: 138 MMOL/L (ref 136–145)
TOTAL PROTEIN: 7.3 G/DL (ref 6.4–8.2)
TRIGL SERPL-MCNC: 181 MG/DL (ref 0–150)
TSH REFLEX: 2.84 UIU/ML (ref 0.27–4.2)
WBC # BLD: 8.4 K/UL (ref 4–11)

## 2022-05-13 NOTE — RESULT ENCOUNTER NOTE
Abdiel Gonzalez, I have reviewed labs and for the most part things look good. Cholesterol levels have worsened slightly, so just work on diet/exercise for this. Your blood counts have dropped slightly, now in the mildly anemic range. It does appear as though there could be some slight iron deficiency. I will go ahead and have our staff call to see if they can add an iron level to the labs that were collected previously. If so, when I get this result I will update you of this. Please call lab to see if they can add iron to labs collected already.

## 2022-05-16 DIAGNOSIS — D64.9 ANEMIA, UNSPECIFIED TYPE: ICD-10-CM

## 2022-05-16 DIAGNOSIS — D64.9 ANEMIA, UNSPECIFIED TYPE: Primary | ICD-10-CM

## 2022-05-16 LAB
IRON SATURATION: 16 % (ref 15–50)
IRON: 66 UG/DL (ref 37–145)
TOTAL IRON BINDING CAPACITY: 425 UG/DL (ref 260–445)

## 2022-06-07 ENCOUNTER — OFFICE VISIT (OUTPATIENT)
Dept: OBGYN CLINIC | Age: 47
End: 2022-06-07
Payer: COMMERCIAL

## 2022-06-07 VITALS
HEART RATE: 99 BPM | WEIGHT: 176.4 LBS | BODY MASS INDEX: 32.26 KG/M2 | DIASTOLIC BLOOD PRESSURE: 76 MMHG | TEMPERATURE: 98.1 F | SYSTOLIC BLOOD PRESSURE: 112 MMHG

## 2022-06-07 DIAGNOSIS — Z01.419 ENCNTR FOR GYN EXAM (GENERAL) (ROUTINE) W/O ABN FINDINGS: Primary | ICD-10-CM

## 2022-06-07 DIAGNOSIS — Z12.4 PAP SMEAR FOR CERVICAL CANCER SCREENING: ICD-10-CM

## 2022-06-07 PROCEDURE — 99396 PREV VISIT EST AGE 40-64: CPT | Performed by: OBSTETRICS & GYNECOLOGY

## 2022-06-07 ASSESSMENT — ENCOUNTER SYMPTOMS
SHORTNESS OF BREATH: 0
VOMITING: 0
ABDOMINAL PAIN: 0
COUGH: 0
DIARRHEA: 0
SORE THROAT: 0
NAUSEA: 0
CONSTIPATION: 0

## 2022-06-07 NOTE — PROGRESS NOTES
10/11/99 40w0d  7 lb 11 oz (3.487 kg) M CS-Unspec Spinal  MARY   2 Term 97 40w0d  8 lb 13 oz (3.997 kg) M CS-Unspec Spinal  MARY      Complications: Nuchal cord affecting delivery   1 IAB                Gynecologic History  Menstrual History:   LMP: Patient's last menstrual period was 2022.  Menstrual Period: regular   Interval Between Menses: 28 days   Duration of Menses: 3 days   Menstrual Flow: Heavy to light   Bleeding between menses: Denies   Denies history of fibroids, endometriosis, ovarian cysts   Mother had a hysterectomy for uterine fibroids    Sexual History:   Contraception: see above   Currently is not sexually active   10 Lifetime partners   Denies history of STIs - cold sores   Denies sexual problems    Pap History:   History of abnormal pap smears: see above   Last pap: see above      Medical History:  Past Medical History:   Diagnosis Date    Abnormal Pap smear of cervix     Herpes simplex virus (HSV) infection        Medications:  Current Outpatient Medications   Medication Sig Dispense Refill    calcium carbonate 600 MG TABS tablet Take 1 tablet by mouth daily      Multiple Vitamins-Minerals (THERAPEUTIC MULTIVITAMIN-MINERALS) tablet Take 1 tablet by mouth daily      Omega-3 Fatty Acids (FISH OIL PO) Take by mouth daily      polyethylene glycol-electrolytes (NULYTELY) 420 g solution TAKE AS DIRECTED DAY PRIOR TO COLONOSCOPY (Patient not taking: Reported on 2022) 1 each 0     No current facility-administered medications for this visit.        Surgical History:  Past Surgical History:   Procedure Laterality Date     SECTION      x 2    DILATION AND CURETTAGE OF UTERUS N/A 2019    VIDEO HYSTEROSCOPY, DILATATION AND CURETTAGE, NOVASURE ENDOMETRIAL ABLATION performed by Erasto Du DO at [place] [address]      Essure    WISDOM TOOTH EXTRACTION         Allergies:  No Known Allergies    Family History:  Family History   Problem Relation Age of Onset    Depression Mother     Heart Attack Father     Heart Disease Father     Cancer Maternal Grandmother 79        breast    Cancer Maternal Grandfather         non-hodgkins    Diabetes Paternal Grandmother     Cancer Paternal Grandfather         lung    No Known Problems Sister       Reports personal/family history of cervical, uterine, ovarian, vulvar, breast, or colon cancers. - Maternal grandmother - breast cancer in her 76s  Denies personal/family history of bleeding or clotting disorders  Denies personal/family history of genetic disorders    Social History:  Social History     Socioeconomic History    Marital status:      Spouse name: None    Number of children: None    Years of education: None    Highest education level: None   Occupational History    Occupation: Radiology tech: [employer]   Tobacco Use    Smoking status: Never Smoker    Smokeless tobacco: Never Used   Vaping Use    Vaping Use: Never used   Substance and Sexual Activity    Alcohol use: Yes     Comment: once a month     Drug use: No    Sexual activity: Yes     Partners: Male   Other Topics Concern    None   Social History Narrative    None     Social Determinants of Health     Financial Resource Strain: Low Risk     Difficulty of Paying Living Expenses: Not hard at all   Food Insecurity: No Food Insecurity    Worried About [address] [employer] in the Last Year: Never true    [facility] in the Last Year: Never true   Transportation Needs:     Lack of Transportation (Medical): Not on file    Lack of Transportation (Non-Medical):  Not on file   Physical Activity:     Days of Exercise per Week: Not on file    Minutes of Exercise per Session: Not on file   Stress:     Feeling of Stress : Not on file   Social Connections:     Frequency of Communication with Friends and Family: Not on file    Frequency of Social Gatherings with Friends and Family: Not on file    Attends Anabaptism Services: Not on file   [facility] or Organizations: Not on file    Attends Club or Organization Meetings: Not on file    Marital Status: Not on file   Intimate Partner Violence:     Fear of Current or Ex-Partner: Not on file    Emotionally Abused: Not on file    Physically Abused: Not on file    Sexually Abused: Not on file   Housing Stability:     Unable to Pay for Housing in the Last Year: Not on file    Number of [place] in the Last Year: Not on file    Unstable Housing in the Last Year: Not on file       Objective:  /76 (Site: Left Upper Arm, Position: Sitting, Cuff Size: Medium Adult)   Pulse 99   Temp 98.1 °F (36.7 °C) (Infrared)   Wt 176 lb 6.4 oz (80 kg)   LMP 06/04/2022   BMI 32.26 kg/m²     Exam:   Physical Exam  Vitals reviewed. Exam conducted with a chaperone present. Constitutional:       General: She is not in acute distress. Appearance: She is well-developed. HENT:      Head: Normocephalic and atraumatic. Eyes:      Conjunctiva/sclera: Conjunctivae normal.   Neck:      Thyroid: No thyromegaly. Trachea: No tracheal deviation. Cardiovascular:      Rate and Rhythm: Normal rate and regular rhythm. Pulmonary:      Effort: Pulmonary effort is normal. No respiratory distress. Chest:   Breasts:      Right: No inverted nipple, mass, nipple discharge, skin change or tenderness. Left: No inverted nipple, mass, nipple discharge, skin change or tenderness. Abdominal:      General: There is no distension. Palpations: Abdomen is soft. There is no mass. Tenderness: There is no abdominal tenderness. There is no guarding or rebound. Genitourinary:     General: Normal vulva. Exam position: Lithotomy position. Labia:         Right: No rash, tenderness or lesion. Left: No rash, tenderness or lesion. Vagina: No signs of injury. No vaginal discharge, erythema, tenderness, bleeding, lesions or prolapsed vaginal walls. Cervix: No cervical motion tenderness, discharge, friability, lesion, erythema or cervical bleeding. Uterus: Not deviated, not enlarged, not fixed and not tender. Adnexa:         Right: No mass, tenderness or fullness. Left: No mass, tenderness or fullness. Musculoskeletal:         General: No swelling. Cervical back: Normal range of motion and neck supple. Skin:     General: Skin is warm and dry. Neurological:      Mental Status: She is alert and oriented to person, place, and time. Psychiatric:         Mood and Affect: Mood normal.         Behavior: Behavior normal.         Thought Content: Thought content normal.         Assessment/Plan:  55 y.o. [id number] presenting for her annual exam:    1. Encntr for gyn exam (general) (routine) w/o abn findings     - Pap smear collected today - will call with results     - Age based screening recommendations discussed     - Self breast exams/awareness discussed with the patient     - Healthy lifestyle habits discussed     - Will follow-up in 1 year for annual exam    2.  Pap smear for cervical cancer screening     - Pap smear collected today - will call with results     - Age based screening recommendations discussed     - Will follow-up in 1 year for annual exam     Jade Montes DO

## 2022-06-21 ENCOUNTER — TELEPHONE (OUTPATIENT)
Dept: SURGERY | Age: 47
End: 2022-06-21

## 2022-06-21 ENCOUNTER — ANESTHESIA EVENT (OUTPATIENT)
Dept: ENDOSCOPY | Age: 47
End: 2022-06-21
Payer: COMMERCIAL

## 2022-06-21 NOTE — TELEPHONE ENCOUNTER
I have placed a reminder call to patient for upcoming procedure. Did you speak directly to patient or leave a voicemail? Voicemail    Prep? NPO 12AM  Colonoscopy prep    Must have a  that is over the age of 25. Must be a friend or family member that can be responsible for signing them out after surgery.     Arrive at the main entrance [facility] at 7:30AM

## 2022-06-21 NOTE — TELEPHONE ENCOUNTER
Patient called at 3:10pm the day before procedure to cancel. She is cancelling due to family matters. Cancellation faxed to surgery, updated outlook calendar.

## 2022-06-22 ENCOUNTER — ANESTHESIA (OUTPATIENT)
Dept: ENDOSCOPY | Age: 47
End: 2022-06-22
Payer: COMMERCIAL

## 2022-06-26 ENCOUNTER — HOSPITAL ENCOUNTER (EMERGENCY)
Age: 47
Discharge: HOME | End: 2022-06-26
Attending: EMERGENCY MEDICINE
Payer: COMMERCIAL

## 2022-06-26 VITALS
BODY MASS INDEX: 33.04 KG/M2 | RESPIRATION RATE: 20 BRPM | HEART RATE: 70 BPM | OXYGEN SATURATION: 98 % | DIASTOLIC BLOOD PRESSURE: 90 MMHG | TEMPERATURE: 98.2 F | SYSTOLIC BLOOD PRESSURE: 165 MMHG | WEIGHT: 175 LBS | HEIGHT: 61 IN

## 2022-06-26 DIAGNOSIS — S46.812A STRAIN OF LEFT TRAPEZIUS MUSCLE, INITIAL ENCOUNTER: Primary | ICD-10-CM

## 2022-06-26 PROCEDURE — 99283 EMERGENCY DEPT VISIT LOW MDM: CPT

## 2022-06-26 PROCEDURE — 6370000000 HC RX 637 (ALT 250 FOR IP): Performed by: EMERGENCY MEDICINE

## 2022-06-26 RX ORDER — LIDOCAINE 4 G/G
1 PATCH TOPICAL ONCE
Status: DISCONTINUED | OUTPATIENT
Start: 2022-06-26 | End: 2022-06-26 | Stop reason: HOSPADM

## 2022-06-26 RX ORDER — METHOCARBAMOL 500 MG/1
500 TABLET, FILM COATED ORAL 4 TIMES DAILY PRN
Qty: 30 TABLET | Refills: 0 | Status: SHIPPED | OUTPATIENT
Start: 2022-06-26 | End: 2022-07-06

## 2022-06-26 RX ORDER — LIDOCAINE 50 MG/G
1 PATCH TOPICAL DAILY
Qty: 30 PATCH | Refills: 0 | Status: SHIPPED | OUTPATIENT
Start: 2022-06-26

## 2022-06-26 RX ORDER — INDOMETHACIN 25 MG/1
25 CAPSULE ORAL 3 TIMES DAILY PRN
Qty: 20 CAPSULE | Refills: 0 | Status: SHIPPED | OUTPATIENT
Start: 2022-06-26

## 2022-06-26 RX ORDER — IBUPROFEN 800 MG/1
800 TABLET ORAL EVERY 8 HOURS PRN
COMMUNITY
End: 2022-06-26 | Stop reason: ALTCHOICE

## 2022-06-26 RX ORDER — CYCLOBENZAPRINE HCL 10 MG
10 TABLET ORAL 3 TIMES DAILY PRN
COMMUNITY
End: 2022-06-26 | Stop reason: ALTCHOICE

## 2022-06-26 RX ORDER — PREDNISONE 10 MG/1
TABLET ORAL
COMMUNITY
End: 2022-06-26 | Stop reason: ALTCHOICE

## 2022-06-26 ASSESSMENT — ENCOUNTER SYMPTOMS
GASTROINTESTINAL NEGATIVE: 1
RESPIRATORY NEGATIVE: 1
EYES NEGATIVE: 1

## 2022-06-26 ASSESSMENT — PAIN DESCRIPTION - ONSET: ONSET: ON-GOING

## 2022-06-26 ASSESSMENT — PAIN DESCRIPTION - ORIENTATION: ORIENTATION: LEFT

## 2022-06-26 ASSESSMENT — PAIN DESCRIPTION - PAIN TYPE: TYPE: ACUTE PAIN

## 2022-06-26 ASSESSMENT — PAIN DESCRIPTION - FREQUENCY: FREQUENCY: INTERMITTENT

## 2022-06-26 ASSESSMENT — PAIN SCALES - GENERAL: PAINLEVEL_OUTOF10: 7

## 2022-06-26 ASSESSMENT — PAIN - FUNCTIONAL ASSESSMENT: PAIN_FUNCTIONAL_ASSESSMENT: 0-10

## 2022-06-26 ASSESSMENT — PAIN DESCRIPTION - LOCATION: LOCATION: NECK;SHOULDER;ARM

## 2022-06-26 NOTE — ED NOTES
Pt has d/c order. D/C instructions given. Prescriptions given. Pt verbalized understanding. Pt out to lobby.          Siddhartha Ribeiro RN  06/26/22 3308

## 2022-06-26 NOTE — ED PROVIDER NOTES
[facility]          ATTENDING PHYSICIAN NOTE       Date of evaluation: 2022    Chief Complaint     Torticollis (c/o left side neck pain going down left shoulder and arm, started when awoke Thursday- worse Friday and went to Urgent Care, given medrol dose pk and muscle relaxer, pain on going, ? pinched nerve)      History of Present Illness     Tiffany Arriola is a 55 y.o. female who presents to the emergency department complaining of pain to the left side of the neck. Patient states that 3 days ago she started developing pain to the left side of her neck. She denies any trauma. She states that she went to an urgent care where they prescribed her ibuprofen, Flexeril, and a Medrol Dosepak but states that these have not been helping. She denies any weakness in the arm but does note occasional tingling in the fingers. She denies any loss control of her bowel or bladder. She states she had similar symptoms approximately 2 months ago but it resolved after a few days taking ibuprofen. Review of Systems     Review of Systems   Constitutional: Negative. HENT: Negative. Eyes: Negative. Respiratory: Negative. Cardiovascular: Negative. Gastrointestinal: Negative. Genitourinary: Negative. Musculoskeletal: Positive for neck pain. Neurological: Negative. All other systems reviewed and are negative. Past Medical, Surgical, Family, and Social History     She has a past medical history of Abnormal Pap smear of cervix and Herpes simplex virus (HSV) infection. She has a past surgical history that includes [place] tooth extraction; Tubal ligation; Dilation and curettage of uterus (N/A, 2019); and  section.   Her family history includes Cancer in her maternal grandfather and paternal grandfather; Cancer (age of onset: 79) in her maternal grandmother; Depression in her mother; Diabetes in her paternal grandmother; Heart Attack in her father; Heart Disease in her father; No Known Problems in her sister. She reports that she has never smoked. She has never used smokeless tobacco. She reports current alcohol use. She reports that she does not use drugs. Medications     Previous Medications    CALCIUM CARBONATE 600 MG TABS TABLET    Take 1 tablet by mouth daily    MULTIPLE VITAMINS-MINERALS (THERAPEUTIC MULTIVITAMIN-MINERALS) TABLET    Take 1 tablet by mouth daily    OMEGA-3 FATTY ACIDS (FISH OIL PO)    Take by mouth daily       Allergies     She has No Known Allergies. Physical Exam     INITIAL VITALS: BP: (!) 165/90, Temp: 98.2 °F (36.8 °C), Heart Rate: 70, Resp: 20, SpO2: 98 %   Physical Exam  Vitals and nursing note reviewed. Constitutional:       General: She is not in acute distress. HENT:      Head: Normocephalic and atraumatic. Mouth/Throat:      Mouth: Mucous membranes are moist.      Pharynx: No oropharyngeal exudate. Eyes:      General: No scleral icterus. Extraocular Movements: Extraocular movements intact. Conjunctiva/sclera: Conjunctivae normal.      Pupils: Pupils are equal, round, and reactive to light. Neck:      Comments: Tender to palpation along the left trapezius muscle. There is no midline tenderness or step-offs noted. Spurling's test is negative. Cardiovascular:      Pulses: Normal pulses. Musculoskeletal:      Cervical back: Muscular tenderness present. No spinous process tenderness. Neurological:      General: No focal deficit present. Mental Status: She is alert and oriented to person, place, and time. Cranial Nerves: No cranial nerve deficit. Motor: No weakness. Coordination: Coordination normal.         Diagnostic Results     RECENT VITALS:  BP: (!) 165/90,Temp: 98.2 °F (36.8 °C), Heart Rate: 70, Resp: 20, SpO2: 98 %     Procedures     N/A    ED Course     Nursing Notes, Past Medical Hx, Past Surgical Hx, Social Hx,Allergies, and Family Hx were reviewed.          patient was given the following medications:  Orders Placed This Encounter   Medications    lidocaine 4 % external patch 1 patch    indomethacin (INDOCIN) 25 MG capsule     Sig: Take 1 capsule by mouth 3 times daily as needed for Pain     Dispense:  20 capsule     Refill:  0    methocarbamol (ROBAXIN) 500 MG tablet     Sig: Take 1 tablet by mouth 4 times daily as needed (Muscle spasm)     Dispense:  30 tablet     Refill:  0    lidocaine (LIDODERM) 5 %     Sig: Place 1 patch onto the skin daily 12 hours on, 12 hours off. Dispense:  30 patch     Refill:  0       CONSULTS:  None    MEDICAL DECISIONMAKING / ASSESSMENT / Lawernce Elif is a 55 y.o. female who presents complaining of pain to the left side of her neck. Patient states the pain has been going on for 3 days. She denies any prior history of trauma. On arrival, patient is hypertensive but otherwise hemodynamically stable. She does have reproducible tenderness along the trapezius muscle. She has no focal neurologic deficits and negative Spurling's test so I have low suspicion for this being cervical radiculopathy. She has no bony tenderness on exam so I do not feel imaging is indicated. Patient will be treated symptomatically. I will have her discontinue the Medrol Dosepak and start using indomethacin, Robaxin, as well as Lidoderm patches. She will be instructed to follow-up with her primary care provider for physical therapy referral if symptoms continue. Clinical Impression     1. Strain of left trapezius muscle, initial encounter        Disposition     PATIENT REFERRED TO:  Tauna Fleischer,   [address] [place] [address]  [phone]            DISCHARGE MEDICATIONS:  New Prescriptions    INDOMETHACIN (INDOCIN) 25 MG CAPSULE    Take 1 capsule by mouth 3 times daily as needed for Pain    LIDOCAINE (LIDODERM) 5 %    Place 1 patch onto the skin daily 12 hours on, 12 hours off.     METHOCARBAMOL (ROBAXIN) 500 MG TABLET Take 1 tablet by mouth 4 times daily as needed (Muscle spasm)       DISPOSITION Decision To Discharge 06/26/2022 04:54:32 AM        Reina Escoto MD  06/26/22 4941

## 2022-07-07 ENCOUNTER — HOSPITAL ENCOUNTER (OUTPATIENT)
Dept: MRI IMAGING | Age: 47
Discharge: HOME | End: 2022-07-07
Payer: COMMERCIAL

## 2022-07-07 DIAGNOSIS — G95.9 CERVICAL MYELOPATHY (HCC): ICD-10-CM

## 2022-07-07 PROCEDURE — 72141 MRI NECK SPINE W/O DYE: CPT

## 2022-08-09 ENCOUNTER — TELEPHONE (OUTPATIENT)
Dept: SURGERY | Age: 47
End: 2022-08-09

## 2022-08-09 NOTE — TELEPHONE ENCOUNTER
I have placed a reminder call to patient for upcoming procedure. Did you speak directly to patient or leave a voicemail? Voicemail    Prep? NPO 12AM  Colonoscopy prep    Must have a  that is over the age of 25. Must be a friend or family member that can be responsible for signing them out after surgery.     Arrive at the main entrance [facility]

## 2022-08-09 NOTE — PROGRESS NOTES
ENDOSCOPY PREOP INSTRUCTIONS      You are scheduled for a procedure at [facility] on 8-10 @ 930. You will need to arrival by: 800 (at least an hour & a half prior to planned start time)  Report to the MAIN entrance on [address] and register at the surgery center on the left-hand side of the lobby  You will need your insurance card and photo id    For your procedure:     PLEASE FOLLOW ALL INSTRUCTIONS & PREPS GIVEN TO YOU BY YOUR DOCTOR'S OFFICE. If you have not received these instructions yet, please call the office immediately. Make sure to read them as soon as received. Bring an accurate list of any medications, including the dose/ frequency, with you on the day of the procedure. Make sure to include over the counter medications. If you are taking blood thinners, Aspirin or diabetic medication, make sure to call your doctor as soon as possible for instructions prior to your procedure. Please dress comfortably and do not wear any lotion, powders or jewelry  Arrange for someone to be with you and sign you out & drive you home after your procedure. We allow 2 adults visitors with you in the hospital & everyone is required to wear a mask.     WOMEN ONLY OF CHILDBEARING AGE: Please make sure to be able to give a urine sample on arrival      If you have further questions, you may contact your Endoscopist's office or Pre Admission Testing staff at [phone]

## 2022-08-10 ENCOUNTER — HOSPITAL ENCOUNTER (OUTPATIENT)
Age: 47
Setting detail: OUTPATIENT SURGERY
Discharge: HOME | End: 2022-08-10
Attending: SURGERY | Admitting: SURGERY
Payer: COMMERCIAL

## 2022-08-10 VITALS
BODY MASS INDEX: 33.79 KG/M2 | TEMPERATURE: 97.3 F | HEART RATE: 62 BPM | DIASTOLIC BLOOD PRESSURE: 86 MMHG | RESPIRATION RATE: 16 BRPM | OXYGEN SATURATION: 99 % | SYSTOLIC BLOOD PRESSURE: 133 MMHG | HEIGHT: 61 IN | WEIGHT: 179 LBS

## 2022-08-10 LAB — PREGNANCY, URINE: NEGATIVE

## 2022-08-10 PROCEDURE — 45378 DIAGNOSTIC COLONOSCOPY: CPT | Performed by: SURGERY

## 2022-08-10 PROCEDURE — 3700000001 HC ADD 15 MINUTES (ANESTHESIA): Performed by: SURGERY

## 2022-08-10 PROCEDURE — 7100000011 HC PHASE II RECOVERY - ADDTL 15 MIN: Performed by: SURGERY

## 2022-08-10 PROCEDURE — 7100000010 HC PHASE II RECOVERY - FIRST 15 MIN: Performed by: SURGERY

## 2022-08-10 PROCEDURE — 3609027000 HC COLONOSCOPY: Performed by: SURGERY

## 2022-08-10 PROCEDURE — 2500000003 HC RX 250 WO HCPCS

## 2022-08-10 PROCEDURE — 6360000002 HC RX W HCPCS

## 2022-08-10 PROCEDURE — 3700000000 HC ANESTHESIA ATTENDED CARE: Performed by: SURGERY

## 2022-08-10 PROCEDURE — 2580000003 HC RX 258: Performed by: ANESTHESIOLOGY

## 2022-08-10 PROCEDURE — 84703 CHORIONIC GONADOTROPIN ASSAY: CPT

## 2022-08-10 RX ORDER — SODIUM CHLORIDE, SODIUM LACTATE, POTASSIUM CHLORIDE, CALCIUM CHLORIDE 600; 310; 30; 20 MG/100ML; MG/100ML; MG/100ML; MG/100ML
INJECTION, SOLUTION INTRAVENOUS CONTINUOUS
Status: DISCONTINUED | OUTPATIENT
Start: 2022-08-10 | End: 2022-08-10 | Stop reason: HOSPADM

## 2022-08-10 RX ORDER — PROPOFOL 10 MG/ML
INJECTION, EMULSION INTRAVENOUS PRN
Status: DISCONTINUED | OUTPATIENT
Start: 2022-08-10 | End: 2022-08-10 | Stop reason: SDUPTHER

## 2022-08-10 RX ORDER — SODIUM CHLORIDE 0.9 % (FLUSH) 0.9 %
5-40 SYRINGE (ML) INJECTION PRN
Status: DISCONTINUED | OUTPATIENT
Start: 2022-08-10 | End: 2022-08-10 | Stop reason: HOSPADM

## 2022-08-10 RX ORDER — PROPOFOL 10 MG/ML
INJECTION, EMULSION INTRAVENOUS CONTINUOUS PRN
Status: DISCONTINUED | OUTPATIENT
Start: 2022-08-10 | End: 2022-08-10 | Stop reason: SDUPTHER

## 2022-08-10 RX ORDER — GABAPENTIN 300 MG/1
300 CAPSULE ORAL 3 TIMES DAILY
COMMUNITY

## 2022-08-10 RX ORDER — LIDOCAINE HYDROCHLORIDE 20 MG/ML
INJECTION, SOLUTION EPIDURAL; INFILTRATION; INTRACAUDAL; PERINEURAL PRN
Status: DISCONTINUED | OUTPATIENT
Start: 2022-08-10 | End: 2022-08-10 | Stop reason: SDUPTHER

## 2022-08-10 RX ORDER — SODIUM CHLORIDE 9 MG/ML
INJECTION, SOLUTION INTRAVENOUS PRN
Status: DISCONTINUED | OUTPATIENT
Start: 2022-08-10 | End: 2022-08-10 | Stop reason: HOSPADM

## 2022-08-10 RX ORDER — SODIUM CHLORIDE 0.9 % (FLUSH) 0.9 %
5-40 SYRINGE (ML) INJECTION EVERY 12 HOURS SCHEDULED
Status: DISCONTINUED | OUTPATIENT
Start: 2022-08-10 | End: 2022-08-10 | Stop reason: HOSPADM

## 2022-08-10 RX ADMIN — PROPOFOL 60 MG: 10 INJECTION, EMULSION INTRAVENOUS at 09:32

## 2022-08-10 RX ADMIN — LIDOCAINE HYDROCHLORIDE 100 MG: 20 INJECTION, SOLUTION EPIDURAL; INFILTRATION; INTRACAUDAL; PERINEURAL at 09:32

## 2022-08-10 RX ADMIN — PROPOFOL 20 MG: 10 INJECTION, EMULSION INTRAVENOUS at 09:33

## 2022-08-10 RX ADMIN — SODIUM CHLORIDE, POTASSIUM CHLORIDE, SODIUM LACTATE AND CALCIUM CHLORIDE: 600; 310; 30; 20 INJECTION, SOLUTION INTRAVENOUS at 07:56

## 2022-08-10 RX ADMIN — PROPOFOL 20 MG: 10 INJECTION, EMULSION INTRAVENOUS at 09:35

## 2022-08-10 RX ADMIN — PROPOFOL 150 MCG/KG/MIN: 10 INJECTION, EMULSION INTRAVENOUS at 09:32

## 2022-08-10 ASSESSMENT — LIFESTYLE VARIABLES: SMOKING_STATUS: 0

## 2022-08-10 ASSESSMENT — PAIN SCALES - GENERAL: PAINLEVEL_OUTOF10: 0

## 2022-08-10 ASSESSMENT — PAIN - FUNCTIONAL ASSESSMENT
PAIN_FUNCTIONAL_ASSESSMENT: 0-10
PAIN_FUNCTIONAL_ASSESSMENT: 0-10

## 2022-08-10 NOTE — ANESTHESIA PRE PROCEDURE
Department of Anesthesiology  Preprocedure Note       Name:  Major Idol   Age:  55 y.o.  :  1975                                          MRN:  [id number]         Date:  8/10/2022      Surgeon: Hilton Pitt):  Melanie Samayoa MD    Procedure: Procedure(s):  COLONOSCOPY, POSSIBLE POLYPECTOMY    Medications prior to admission:   Prior to Admission medications    Medication Sig Start Date End Date Taking? Authorizing Provider   gabapentin (NEURONTIN) 300 MG capsule Take 300 mg by mouth in the morning and 300 mg at noon and 300 mg before bedtime.    Yes Historical Provider, MD   indomethacin (INDOCIN) 25 MG capsule Take 1 capsule by mouth 3 times daily as needed for Pain 22   Fan Cantu MD   lidocaine (LIDODERM) 5 % Place 1 patch onto the skin daily 12 hours on, 12 hours off. 22   Fan Cantu MD   calcium carbonate 600 MG TABS tablet Take 1 tablet by mouth daily    Historical Provider, MD   Multiple Vitamins-Minerals (THERAPEUTIC MULTIVITAMIN-MINERALS) tablet Take 1 tablet by mouth daily    Historical Provider, MD   Omega-3 Fatty Acids (FISH OIL PO) Take by mouth daily    Historical Provider, MD       Current medications:    Current Facility-Administered Medications   Medication Dose Route Frequency Provider Last Rate Last Admin    lactated ringers infusion   IntraVENous Continuous Antonina Stapler,  mL/hr at 08/10/22 0756 New Bag at 08/10/22 0756    sodium chloride flush 0.9 % injection 5-40 mL  5-40 mL IntraVENous 2 times per day Antonina Stapler, DO        sodium chloride flush 0.9 % injection 5-40 mL  5-40 mL IntraVENous PRN Antonina Stapler, DO        0.9 % sodium chloride infusion   IntraVENous PRN Antonina Stapler, DO           Allergies:  No Known Allergies    Problem List:    Patient Active Problem List   Diagnosis Code    Obese E66.9    Menorrhagia with regular cycle N92.0    S/P D&C (status post dilation and curettage) Z98.890    S/P endometrial ablation N60.529 Past Medical History:        Diagnosis Date    Abnormal Pap smear of cervix     Herpes simplex virus (HSV) infection        Past Surgical History:        Procedure Laterality Date     SECTION      x 2    DILATION AND CURETTAGE OF UTERUS N/A 2019    VIDEO HYSTEROSCOPY, DILATATION AND CURETTAGE, NOVASURE ENDOMETRIAL ABLATION performed by Demond Greer DO at [facility] [address]      [facility]    WISDOM TOOTH EXTRACTION         Social History:    Social History     Tobacco Use    Smoking status: Never    Smokeless tobacco: Never   Substance Use Topics    Alcohol use: Yes     Comment: once a month                                 Counseling given: Not Answered      Vital Signs (Current):   Vitals:    08/10/22 0752   BP: 133/82   Pulse: 69   Resp: 18   Temp: 97.2 °F (36.2 °C)   TempSrc: Temporal   SpO2: 95%   Weight: 179 lb (81.2 kg)   Height: 5' 1\" (1.549 m)                                              BP Readings from Last 3 Encounters:   08/10/22 133/82   22 (!) 165/90   22 112/76       NPO Status: Time of last liquid consumption:                         Time of last solid consumption: 700                        Date of last liquid consumption: 22                        Date of last solid food consumption: 22    BMI:   Wt Readings from Last 3 Encounters:   08/10/22 179 lb (81.2 kg)   22 175 lb (79.4 kg)   22 176 lb 6.4 oz (80 kg)     Body mass index is 33.82 kg/m².     CBC:   Lab Results   Component Value Date/Time    WBC 8.4 2022 07:43 AM    RBC 4.60 2022 07:43 AM    HGB 11.4 2022 07:43 AM    HCT 34.9 2022 07:43 AM    MCV 75.7 2022 07:43 AM    RDW 18.0 2022 07:43 AM     2022 07:43 AM       CMP:   Lab Results   Component Value Date/Time     2022 07:43 AM    K 4.2 2022 07:43 AM     2022 07:43 AM    CO2 21 2022 07:43 AM    BUN 14 2022 07:43 AM CREATININE 0.8 05/12/2022 07:43 AM    GFRAA >60 05/12/2022 07:43 AM    AGRATIO 1.4 05/12/2022 07:43 AM    LABGLOM >60 05/12/2022 07:43 AM    LABGLOM 85 01/27/2014 12:20 PM    GLUCOSE 97 05/12/2022 07:43 AM    PROT 7.3 05/12/2022 07:43 AM    CALCIUM 9.2 05/12/2022 07:43 AM    BILITOT 0.3 05/12/2022 07:43 AM    ALKPHOS 75 05/12/2022 07:43 AM    AST 21 05/12/2022 07:43 AM    ALT 17 05/12/2022 07:43 AM       POC Tests: No results for input(s): POCGLU, POCNA, POCK, POCCL, POCBUN, POCHEMO, POCHCT in the last 72 hours. Coags: No results found for: PROTIME, INR, APTT    HCG (If Applicable):   Lab Results   Component Value Date    PREGTESTUR Negative 08/10/2022        ABGs: No results found for: PHART, PO2ART, WXJ6XCZ, UZG3YBI, BEART, M4ZJRFNY     Type & Screen (If Applicable):  No results found for: LABABO, LABRH    Drug/Infectious Status (If Applicable):  No results found for: HIV, HEPCAB    COVID-19 Screening (If Applicable): No results found for: COVID19        Anesthesia Evaluation  Patient summary reviewed and Nursing notes reviewed no history of anesthetic complications:   Airway: Mallampati: II  TM distance: >3 FB   Neck ROM: full  Mouth opening: > = 3 FB   Dental: normal exam         Pulmonary: breath sounds clear to auscultation      (-) not a current smoker (never)                          ROS comment: covid Dec 2021  Mild case     Cardiovascular:  Exercise tolerance: good (>4 METS),       (-) past MI    NYHA Classification: II    Rhythm: regular  Rate: normal           Beta Blocker:  Not on Beta Blocker         Neuro/Psych:   Negative Neuro/Psych ROS              GI/Hepatic/Renal:   (+) bowel prep,      (-) GERD       Endo/Other: Negative Endo/Other ROS                    Abdominal:             Vascular: negative vascular ROS. Other Findings:           Anesthesia Plan      MAC     ASA 1       Induction: intravenous. MIPS: Prophylactic antiemetics administered.   Anesthetic plan and risks discussed with patient. Plan discussed with CRNA.     Attending anesthesiologist reviewed and agrees with Preprocedure content                Josh Mendoza DO   8/10/2022

## 2022-08-10 NOTE — DISCHARGE INSTRUCTIONS
WHAT TO EXPECT AFTER YOUR COLONOSCOPY - DR. JEAN          The nurses will watch you in the recovery area for 1 to 2 hours until the medicines wear off. Then you can go home. You will need a ride. You can resume a normal diet after the procedure. You are legally not allowed to drive or operate machinery after the procedure, as you will have received sedation. You should avoid alcohol until the next day. Do not plan on going back to work after your procedure. If you are on any blood thinners, such as aspirin, Plavix, Coumadin, Xarelto, Eliquis, etc., be sure to ask your physician when you may resume these. This will depend on the reason for the medication usage, and if any polyps were removed during the procedure. All of your other medications you can resume normally. Your doctor will talk to you about when you will need your next colonoscopy. This will depend on the results of the colonoscopy and your medical and family history. Complications:    Colonoscopy is generally a very safe procedure with low complication risk. Common complaints after the procedure include bloating and excessive flatulence, and occasionally nausea. This is normal.     Other complications include: Anesthesia/sedation issues  Bleeding, especially if polyps are removed (uncommon)  Most bleeding will stop on its own, but occasionally can require a another colonoscopy, blood products, or hospital admission  This risk is slightly higher in patients on blood thinners. Perforation, or a hole in the colon, which can require hospital admission or emergency surgery (very rare)    Call the office [phone] or go to your nearest emergency room if you have fever greater than 101º, severe abdominal pain that does not get better after a few hours, or rectal bleeding that does not stop after a few hours. You may also call the office with any non-emergency questions or concerns.      Follow-up care is a key part of your treatment and safety:    If polyps or other abnormalities are found, tissue samples will be sent to the pathology lab to be examined. Results are usually mailed to your address on file in 1 to 2 weeks. Occasionally, we may call you with results. If you do not hear from us, please CALL US and ask for your results: [phone]. Sometimes these results will determine when your next colonoscopy is recommended. Be sure to communicate with your primary care physician about the results of your colonoscopy as well.

## 2022-08-10 NOTE — H&P
lb (81.2 kg)   LMP 07/23/2022 (Exact Date)   SpO2 95%   BMI 33.82 kg/m²  Body mass index is 33.82 kg/m². Constitutional: Appears well-developed and well-nourished. Grooming appropriate. Head: Normocephalic, atraumatic. Eyes: No scleral icterus. Vision intact grossly. ENT: Hearing grossly intact. No facial deformity. Cardiovascular: Normal rate on monitor. Pulmonary/Chest: Effort normal. No respiratory distress. No wheezes. No use of accessory muscles. Musculoskeletal: Normal range of motion of UE. No gross deformity. Neurological: Alert and oriented to person, place, and time. No gross deficits. Psychiatric: Normal mood and affect. Behavior normal. Oriented to person, place, and time. Abdomen: soft, NTTP, non distended    Recent labs/radiology reviewed as appropriate    Assessment/Plan:     Referred by PCP for screening colonoscopy    Previous colonoscopy: no  Family history of colorectal cancer: no  Symptoms: no    Anesthesia to provide sedation. Please see their documentation regarding airway and ASA classification. Proceed as planned for endoscopy, possible polypectomy    Risks/benefits/alternatives of procedure discussed with patient and any present family members. Risks including, but not limited to: bleeding, perforation, post polypectomy syndrome, splenic injury, need for additional procedures or surgery, risks of anesthesia. Patient understands it is their responsibility to call office for pathology results if they do not hear from my office within 1-2 weeks. All questions answered.     Electronically signed by Clay Ndiaye MD on 8/10/2022 at 9:26 AM

## 2022-08-10 NOTE — PROGRESS NOTES
Ambulatory Surgery/Procedure Discharge Note    Patient tolerated procedure well. Patient denies nausea, cramping or pain post procedure. Discharge instructions and education reviewed with patient and Son's girlfriend. Written instructions provided at discharge. Patient discharged ambulatory in wheelchair to car. Son's girlfriend to drive pt home. Vitals:    08/10/22 1020   BP: 133/86   Pulse: 62   Resp: 16   Temp:    SpO2: 99%       In: 740 [P.O.:240; I.V.:500]  Out: -     Restroom use offered before discharge. Yes    Pain assessment:  none  Pain Level: 0        Patient discharged to home/self care.  Patient discharged via wheel chair by transporter to waiting family/S.O.       8/10/2022 1040 AM

## 2022-08-10 NOTE — ANESTHESIA POSTPROCEDURE EVALUATION
Department of Anesthesiology  Postprocedure Note    Patient: Aracelis Kerr  MRN: [id number]  YOB: 1975  Date of evaluation: 8/10/2022      Procedure Summary     Date: 08/10/22 Room / Location: [address]    Anesthesia Start: 6068 Anesthesia Stop: 2613    Procedure: COLONOSCOPY, POSSIBLE POLYPECTOMY Diagnosis:       Screening for colon cancer      (Screening for colon cancer [Z12.11])    Surgeons: Kris Hastings MD Responsible Provider: Xander Cline DO    Anesthesia Type: MAC ASA Status: 1          Anesthesia Type: No value filed.     Dario Phase I: Dario Score: 10    Dario Phase II:        Anesthesia Post Evaluation    Patient location during evaluation: PACU  Patient participation: complete - patient participated  Level of consciousness: awake and alert  Pain score: 0  Airway patency: patent  Nausea & Vomiting: no nausea and no vomiting  Complications: no  Cardiovascular status: hemodynamically stable  Respiratory status: acceptable  Hydration status: stable

## 2023-03-23 SDOH — ECONOMIC STABILITY: FOOD INSECURITY: WITHIN THE PAST 12 MONTHS, YOU WORRIED THAT YOUR FOOD WOULD RUN OUT BEFORE YOU GOT MONEY TO BUY MORE.: NEVER TRUE

## 2023-03-23 SDOH — ECONOMIC STABILITY: INCOME INSECURITY: HOW HARD IS IT FOR YOU TO PAY FOR THE VERY BASICS LIKE FOOD, HOUSING, MEDICAL CARE, AND HEATING?: NOT HARD AT ALL

## 2023-03-23 SDOH — ECONOMIC STABILITY: FOOD INSECURITY: WITHIN THE PAST 12 MONTHS, THE FOOD YOU BOUGHT JUST DIDN'T LAST AND YOU DIDN'T HAVE MONEY TO GET MORE.: NEVER TRUE

## 2023-03-23 SDOH — ECONOMIC STABILITY: HOUSING INSECURITY
IN THE LAST 12 MONTHS, WAS THERE A TIME WHEN YOU DID NOT HAVE A STEADY PLACE TO SLEEP OR SLEPT IN A SHELTER (INCLUDING NOW)?: NO

## 2023-03-23 SDOH — ECONOMIC STABILITY: TRANSPORTATION INSECURITY
IN THE PAST 12 MONTHS, HAS LACK OF TRANSPORTATION KEPT YOU FROM MEETINGS, WORK, OR FROM GETTING THINGS NEEDED FOR DAILY LIVING?: NO

## 2023-03-24 ENCOUNTER — OFFICE VISIT (OUTPATIENT)
Dept: OBGYN CLINIC | Age: 48
End: 2023-03-24

## 2023-03-24 VITALS
DIASTOLIC BLOOD PRESSURE: 74 MMHG | TEMPERATURE: 98.2 F | WEIGHT: 182.2 LBS | SYSTOLIC BLOOD PRESSURE: 136 MMHG | HEIGHT: 61 IN | HEART RATE: 82 BPM | BODY MASS INDEX: 34.4 KG/M2

## 2023-03-24 DIAGNOSIS — N89.8 VAGINAL DISCHARGE: ICD-10-CM

## 2023-03-24 DIAGNOSIS — Z20.2 POSSIBLE EXPOSURE TO STD: Primary | ICD-10-CM

## 2023-03-24 NOTE — PROGRESS NOTES
Return Gyn Office Visit    CC:   Chief Complaint   Patient presents with    Exposure to STD       HPI:  Kennedi Nieto is a 52 y.o. female who presents for ST testing    Patient is seen and examined today. Patient is overall doing well today. Patient presents today for STI testing. Reports a new sexual partner that later complained of discharge. Denies condom use with this partner. Has not had any discharge, itching or pain. Denies dysuria or other urinary complaints. Denies chest pain, shortness of breath, fever, chills, nausea, vomiting. Review of Systems - The following ROS was otherwise negative, except as noted in the HPI: constitutional, respiratory, cardiovascular, gastrointestinal, genitourinary    Objective:  /74 (Site: Left Upper Arm, Position: Sitting, Cuff Size: Medium Adult)   Pulse 82   Temp 98.2 °F (36.8 °C) (Infrared)   Ht 5' 1\" (1.549 m)   Wt 182 lb 3.2 oz (82.6 kg)   LMP 03/10/2023 (Exact Date)   BMI 34.43 kg/m²     Physical Exam  Vitals reviewed. Exam conducted with a chaperone present. Constitutional:       General: She is not in acute distress. Appearance: She is well-developed. HENT:      Head: Normocephalic and atraumatic. Eyes:      Conjunctiva/sclera: Conjunctivae normal.   Cardiovascular:      Rate and Rhythm: Normal rate. Pulmonary:      Effort: Pulmonary effort is normal. No respiratory distress. Abdominal:      General: There is no distension. Palpations: Abdomen is soft. Tenderness: There is no abdominal tenderness. There is no guarding or rebound. Genitourinary:     General: Normal vulva. Exam position: Lithotomy position. Labia:         Right: No rash, tenderness or lesion. Left: No rash, tenderness or lesion. Vagina: No signs of injury. Vaginal discharge present. No erythema, tenderness, bleeding or lesions.       Cervix: No cervical motion tenderness, discharge, friability, lesion, erythema or cervical

## 2023-03-25 LAB
CANDIDA DNA VAG QL NAA+PROBE: ABNORMAL
G VAGINALIS DNA SPEC QL NAA+PROBE: ABNORMAL
HIV 1+2 AB+HIV1 P24 AG SERPL QL IA: NORMAL
HIV 2 AB SERPL QL IA: NORMAL
HIV1 AB SERPL QL IA: NORMAL
HIV1 P24 AG SERPL QL IA: NORMAL
T VAGINALIS DNA VAG QL NAA+PROBE: ABNORMAL

## 2023-03-26 LAB — HCV AB SERPL QL IA: NORMAL

## 2023-03-28 LAB
C TRACH DNA CVX QL NAA+PROBE: NEGATIVE
N GONORRHOEA DNA CERV MUCUS QL NAA+PROBE: NEGATIVE

## 2023-06-16 ENCOUNTER — OFFICE VISIT (OUTPATIENT)
Dept: PRIMARY CARE CLINIC | Age: 48
End: 2023-06-16
Payer: COMMERCIAL

## 2023-06-16 VITALS
SYSTOLIC BLOOD PRESSURE: 120 MMHG | WEIGHT: 178.2 LBS | HEART RATE: 65 BPM | HEIGHT: 61 IN | BODY MASS INDEX: 33.64 KG/M2 | OXYGEN SATURATION: 99 % | DIASTOLIC BLOOD PRESSURE: 79 MMHG | TEMPERATURE: 98.4 F

## 2023-06-16 DIAGNOSIS — E66.09 CLASS 1 OBESITY DUE TO EXCESS CALORIES WITHOUT SERIOUS COMORBIDITY WITH BODY MASS INDEX (BMI) OF 33.0 TO 33.9 IN ADULT: ICD-10-CM

## 2023-06-16 DIAGNOSIS — Z86.2 HISTORY OF ANEMIA: ICD-10-CM

## 2023-06-16 DIAGNOSIS — Z00.00 ANNUAL PHYSICAL EXAM: Primary | ICD-10-CM

## 2023-06-16 PROCEDURE — 99396 PREV VISIT EST AGE 40-64: CPT | Performed by: FAMILY MEDICINE

## 2023-06-16 ASSESSMENT — PATIENT HEALTH QUESTIONNAIRE - PHQ9
SUM OF ALL RESPONSES TO PHQ QUESTIONS 1-9: 0
SUM OF ALL RESPONSES TO PHQ QUESTIONS 1-9: 0
1. LITTLE INTEREST OR PLEASURE IN DOING THINGS: 0
SUM OF ALL RESPONSES TO PHQ QUESTIONS 1-9: 0
SUM OF ALL RESPONSES TO PHQ QUESTIONS 1-9: 0

## 2023-06-16 ASSESSMENT — ENCOUNTER SYMPTOMS
ABDOMINAL PAIN: 0
SHORTNESS OF BREATH: 0
NAUSEA: 0
SORE THROAT: 0
COUGH: 0

## 2023-06-23 DIAGNOSIS — Z00.00 ANNUAL PHYSICAL EXAM: ICD-10-CM

## 2023-06-23 DIAGNOSIS — Z86.2 HISTORY OF ANEMIA: ICD-10-CM

## 2023-06-23 LAB
ALBUMIN SERPL-MCNC: 4.5 G/DL (ref 3.4–5)
ALBUMIN/GLOB SERPL: 1.5 {RATIO} (ref 1.1–2.2)
ALP SERPL-CCNC: 84 U/L (ref 40–129)
ALT SERPL-CCNC: 17 U/L (ref 10–40)
ANION GAP SERPL CALCULATED.3IONS-SCNC: 10 MMOL/L (ref 3–16)
AST SERPL-CCNC: 18 U/L (ref 15–37)
BASOPHILS # BLD: 0.1 K/UL (ref 0–0.2)
BASOPHILS NFR BLD: 0.5 %
BILIRUB SERPL-MCNC: <0.2 MG/DL (ref 0–1)
BUN SERPL-MCNC: 15 MG/DL (ref 7–20)
CALCIUM SERPL-MCNC: 9.7 MG/DL (ref 8.3–10.6)
CHLORIDE SERPL-SCNC: 100 MMOL/L (ref 99–110)
CHOLEST SERPL-MCNC: 226 MG/DL (ref 0–199)
CO2 SERPL-SCNC: 25 MMOL/L (ref 21–32)
CREAT SERPL-MCNC: 0.9 MG/DL (ref 0.6–1.1)
DEPRECATED RDW RBC AUTO: 14.2 % (ref 12.4–15.4)
EOSINOPHIL # BLD: 0.3 K/UL (ref 0–0.6)
EOSINOPHIL NFR BLD: 3 %
GFR SERPLBLD CREATININE-BSD FMLA CKD-EPI: >60 ML/MIN/{1.73_M2}
GLUCOSE SERPL-MCNC: 86 MG/DL (ref 70–99)
HCT VFR BLD AUTO: 39.6 % (ref 36–48)
HDLC SERPL-MCNC: 38 MG/DL (ref 40–60)
HGB BLD-MCNC: 13.4 G/DL (ref 12–16)
IRON SATN MFR SERPL: 10 % (ref 15–50)
IRON SERPL-MCNC: 39 UG/DL (ref 37–145)
LDLC SERPL CALC-MCNC: 147 MG/DL
LYMPHOCYTES # BLD: 4.3 K/UL (ref 1–5.1)
LYMPHOCYTES NFR BLD: 39.5 %
MCH RBC QN AUTO: 30 PG (ref 26–34)
MCHC RBC AUTO-ENTMCNC: 34 G/DL (ref 31–36)
MCV RBC AUTO: 88.3 FL (ref 80–100)
MONOCYTES # BLD: 0.8 K/UL (ref 0–1.3)
MONOCYTES NFR BLD: 7 %
NEUTROPHILS # BLD: 5.4 K/UL (ref 1.7–7.7)
NEUTROPHILS NFR BLD: 50 %
PLATELET # BLD AUTO: 296 K/UL (ref 135–450)
PMV BLD AUTO: 9.1 FL (ref 5–10.5)
POTASSIUM SERPL-SCNC: 4.4 MMOL/L (ref 3.5–5.1)
PROT SERPL-MCNC: 7.5 G/DL (ref 6.4–8.2)
RBC # BLD AUTO: 4.48 M/UL (ref 4–5.2)
SODIUM SERPL-SCNC: 135 MMOL/L (ref 136–145)
TIBC SERPL-MCNC: 379 UG/DL (ref 260–445)
TRIGL SERPL-MCNC: 204 MG/DL (ref 0–150)
TSH SERPL DL<=0.005 MIU/L-ACNC: 3.9 UIU/ML (ref 0.27–4.2)
WBC # BLD AUTO: 10.8 K/UL (ref 4–11)

## 2023-06-23 SDOH — HEALTH STABILITY: PHYSICAL HEALTH: ON AVERAGE, HOW MANY DAYS PER WEEK DO YOU ENGAGE IN MODERATE TO STRENUOUS EXERCISE (LIKE A BRISK WALK)?: 3 DAYS

## 2023-06-23 SDOH — HEALTH STABILITY: PHYSICAL HEALTH: ON AVERAGE, HOW MANY MINUTES DO YOU ENGAGE IN EXERCISE AT THIS LEVEL?: 40 MIN

## 2023-06-23 ASSESSMENT — SOCIAL DETERMINANTS OF HEALTH (SDOH)
WITHIN THE LAST YEAR, HAVE YOU BEEN HUMILIATED OR EMOTIONALLY ABUSED IN OTHER WAYS BY YOUR PARTNER OR EX-PARTNER?: NO
WITHIN THE LAST YEAR, HAVE YOU BEEN AFRAID OF YOUR PARTNER OR EX-PARTNER?: NO
WITHIN THE LAST YEAR, HAVE TO BEEN RAPED OR FORCED TO HAVE ANY KIND OF SEXUAL ACTIVITY BY YOUR PARTNER OR EX-PARTNER?: NO
WITHIN THE LAST YEAR, HAVE YOU BEEN KICKED, HIT, SLAPPED, OR OTHERWISE PHYSICALLY HURT BY YOUR PARTNER OR EX-PARTNER?: NO

## 2023-06-26 ENCOUNTER — OFFICE VISIT (OUTPATIENT)
Dept: ORTHOPEDIC SURGERY | Age: 48
End: 2023-06-26

## 2023-06-26 VITALS — WEIGHT: 178 LBS | HEIGHT: 61 IN | BODY MASS INDEX: 33.61 KG/M2

## 2023-06-26 DIAGNOSIS — M25.561 RIGHT KNEE PAIN, UNSPECIFIED CHRONICITY: Primary | ICD-10-CM

## 2023-06-26 RX ORDER — METHYLPREDNISOLONE 4 MG/1
TABLET ORAL
Qty: 1 KIT | Refills: 0 | Status: SHIPPED | OUTPATIENT
Start: 2023-06-26

## 2023-06-27 ENCOUNTER — HOSPITAL ENCOUNTER (OUTPATIENT)
Dept: MAMMOGRAPHY | Age: 48
Discharge: HOME | End: 2023-06-27
Payer: COMMERCIAL

## 2023-06-27 VITALS — BODY MASS INDEX: 33.61 KG/M2 | HEIGHT: 61 IN | WEIGHT: 178 LBS

## 2023-06-27 DIAGNOSIS — Z12.31 VISIT FOR SCREENING MAMMOGRAM: ICD-10-CM

## 2023-06-27 PROCEDURE — 77063 BREAST TOMOSYNTHESIS BI: CPT

## 2023-06-29 NOTE — RESULT ENCOUNTER NOTE
Bernis Moritz, reviewed labs. Only thing to mention is the cholesterol levels continue to run high. Continue to work on diet and exercise!

## 2023-07-18 ENCOUNTER — OFFICE VISIT (OUTPATIENT)
Dept: OBGYN CLINIC | Age: 48
End: 2023-07-18
Payer: COMMERCIAL

## 2023-07-18 VITALS
BODY MASS INDEX: 34.36 KG/M2 | SYSTOLIC BLOOD PRESSURE: 138 MMHG | OXYGEN SATURATION: 98 % | WEIGHT: 182 LBS | TEMPERATURE: 97 F | DIASTOLIC BLOOD PRESSURE: 82 MMHG | HEIGHT: 61 IN | HEART RATE: 66 BPM

## 2023-07-18 DIAGNOSIS — Z01.419 ENCNTR FOR GYN EXAM (GENERAL) (ROUTINE) W/O ABN FINDINGS: Primary | ICD-10-CM

## 2023-07-18 DIAGNOSIS — Z98.890 S/P ENDOMETRIAL ABLATION: ICD-10-CM

## 2023-07-18 PROCEDURE — 99396 PREV VISIT EST AGE 40-64: CPT | Performed by: OBSTETRICS & GYNECOLOGY

## 2023-07-18 ASSESSMENT — ENCOUNTER SYMPTOMS
SORE THROAT: 0
VOMITING: 0
COUGH: 0
CONSTIPATION: 0
ABDOMINAL PAIN: 0
DIARRHEA: 0
NAUSEA: 0
SHORTNESS OF BREATH: 0

## 2023-07-18 NOTE — PROGRESS NOTES
to date     - Age based screening recommendations discussed     - Mammogram up to date     - Self breast exams/awareness discussed with the patient     - Colonoscopy up to date     - Healthy lifestyle habits discussed     - Will follow-up in 1 year for annual exam    2.  S/P endometrial ablation     - Continues to have regular menses, every 3 months or so is increasingly painful     - Denies heavy bleeding     - Differential reviewed with the patient     - Risks, benefits and alternatives were reviewed      - If worsens, becomes unresponsive to NSAIDs will follow-up in office for Levi Cheng DO

## 2023-07-25 ENCOUNTER — OFFICE VISIT (OUTPATIENT)
Dept: PRIMARY CARE CLINIC | Age: 48
End: 2023-07-25
Payer: COMMERCIAL

## 2023-07-25 VITALS
OXYGEN SATURATION: 99 % | TEMPERATURE: 97.7 F | HEART RATE: 63 BPM | HEIGHT: 61 IN | BODY MASS INDEX: 34.1 KG/M2 | DIASTOLIC BLOOD PRESSURE: 84 MMHG | WEIGHT: 180.6 LBS | SYSTOLIC BLOOD PRESSURE: 144 MMHG

## 2023-07-25 DIAGNOSIS — N39.0 ACUTE UTI: Primary | ICD-10-CM

## 2023-07-25 LAB
APPEARANCE FLUID: ABNORMAL
BILIRUBIN, POC: ABNORMAL
BLOOD URINE, POC: ABNORMAL
CLARITY, POC: ABNORMAL
COLOR, POC: ABNORMAL
GLUCOSE URINE, POC: ABNORMAL
KETONES, POC: ABNORMAL
LEUKOCYTE EST, POC: ABNORMAL
NITRITE, POC: POSITIVE
PH, POC: 6
PROTEIN, POC: 30
SPECIFIC GRAVITY, POC: 1.02
UROBILINOGEN, POC: 0.2

## 2023-07-25 PROCEDURE — 81002 URINALYSIS NONAUTO W/O SCOPE: CPT | Performed by: FAMILY MEDICINE

## 2023-07-25 PROCEDURE — 99213 OFFICE O/P EST LOW 20 MIN: CPT | Performed by: FAMILY MEDICINE

## 2023-07-25 RX ORDER — CIPROFLOXACIN 250 MG/1
250 TABLET, FILM COATED ORAL 2 TIMES DAILY
Qty: 10 TABLET | Refills: 0 | Status: SHIPPED | OUTPATIENT
Start: 2023-07-25 | End: 2023-07-30

## 2023-07-25 NOTE — PROGRESS NOTES
[facility]. PRIMARY CARE  [address] [facility] [address]  Dept: [phone]  Dept Fax: [phone]     2023      Vinay Dimas   1975     Chief Complaint   Patient presents with    Other     UTI frequency cloudily urine and painful        HPI  Pt comes in today for acute onset symptoms concerning for UTI. She started with frequency, dysuria and cloudy urine Friday. Denies fever or N/V. Nothing for this except fluids and cranberry juice. PHQ Scores 2023   PHQ2 Score - 0 0 0 0   PHQ9 Score 0 0 0 0 0     Interpretation of Total Score Depression Severity: 1-4 = Minimal depression, 5-9 = Mild depression, 10-14 = Moderate depression, 15-19 = Moderately severe depression, 20-27 = Severe depression     Prior to Visit Medications    Medication Sig Taking? Authorizing Provider   calcium carbonate 600 MG TABS tablet Take 1 tablet by mouth daily Yes Historical Provider, MD   Multiple Vitamins-Minerals (THERAPEUTIC MULTIVITAMIN-MINERALS) tablet Take 1 tablet by mouth daily Yes Historical Provider, MD   Omega-3 Fatty Acids (FISH OIL PO) Take by mouth daily Yes Historical Provider, MD       Past Medical History:   Diagnosis Date    Abnormal Pap smear of cervix     Herpes simplex virus (HSV) infection         Social History     Tobacco Use    Smoking status: Never    Smokeless tobacco: Never   Vaping Use    Vaping Use: Never used   Substance Use Topics    Alcohol use:  Yes     Alcohol/week: 1.0 standard drink     Types: 1 Cans of beer per week     Comment: Only socially on occasion    Drug use: No        Past Surgical History:   Procedure Laterality Date     SECTION      x 2    COLONOSCOPY N/A 8/10/2022    COLONOSCOPY, POSSIBLE POLYPECTOMY performed by Jim Muro MD at [address] [place] [address] 2019    VIDEO HYSTEROSCOPY, DILATATION AND CURETTAGE, NOVASURE ENDOMETRIAL ABLATION performed

## 2023-12-28 ENCOUNTER — TELEPHONE (OUTPATIENT)
Dept: PRIMARY CARE CLINIC | Age: 48
End: 2023-12-28

## 2024-05-28 ASSESSMENT — PATIENT HEALTH QUESTIONNAIRE - PHQ9
SUM OF ALL RESPONSES TO PHQ QUESTIONS 1-9: 0
SUM OF ALL RESPONSES TO PHQ QUESTIONS 1-9: 0
1. LITTLE INTEREST OR PLEASURE IN DOING THINGS: NOT AT ALL
SUM OF ALL RESPONSES TO PHQ9 QUESTIONS 1 & 2: 0
2. FEELING DOWN, DEPRESSED OR HOPELESS: NOT AT ALL
2. FEELING DOWN, DEPRESSED OR HOPELESS: NOT AT ALL
1. LITTLE INTEREST OR PLEASURE IN DOING THINGS: NOT AT ALL
SUM OF ALL RESPONSES TO PHQ QUESTIONS 1-9: 0
SUM OF ALL RESPONSES TO PHQ QUESTIONS 1-9: 0
SUM OF ALL RESPONSES TO PHQ9 QUESTIONS 1 & 2: 0

## 2024-05-31 ENCOUNTER — OFFICE VISIT (OUTPATIENT)
Dept: PRIMARY CARE CLINIC | Age: 49
End: 2024-05-31
Payer: COMMERCIAL

## 2024-05-31 VITALS
HEART RATE: 65 BPM | HEIGHT: 61 IN | TEMPERATURE: 97.9 F | SYSTOLIC BLOOD PRESSURE: 109 MMHG | WEIGHT: 169.8 LBS | OXYGEN SATURATION: 100 % | BODY MASS INDEX: 32.06 KG/M2 | DIASTOLIC BLOOD PRESSURE: 68 MMHG

## 2024-05-31 DIAGNOSIS — Z12.31 BREAST CANCER SCREENING BY MAMMOGRAM: ICD-10-CM

## 2024-05-31 DIAGNOSIS — E66.09 CLASS 1 OBESITY DUE TO EXCESS CALORIES WITHOUT SERIOUS COMORBIDITY WITH BODY MASS INDEX (BMI) OF 31.0 TO 31.9 IN ADULT: ICD-10-CM

## 2024-05-31 DIAGNOSIS — Z00.00 ANNUAL PHYSICAL EXAM: Primary | ICD-10-CM

## 2024-05-31 PROCEDURE — 99396 PREV VISIT EST AGE 40-64: CPT | Performed by: FAMILY MEDICINE

## 2024-05-31 SDOH — ECONOMIC STABILITY: FOOD INSECURITY: WITHIN THE PAST 12 MONTHS, THE FOOD YOU BOUGHT JUST DIDN'T LAST AND YOU DIDN'T HAVE MONEY TO GET MORE.: NEVER TRUE

## 2024-05-31 SDOH — ECONOMIC STABILITY: INCOME INSECURITY: HOW HARD IS IT FOR YOU TO PAY FOR THE VERY BASICS LIKE FOOD, HOUSING, MEDICAL CARE, AND HEATING?: NOT HARD AT ALL

## 2024-05-31 SDOH — ECONOMIC STABILITY: FOOD INSECURITY: WITHIN THE PAST 12 MONTHS, YOU WORRIED THAT YOUR FOOD WOULD RUN OUT BEFORE YOU GOT MONEY TO BUY MORE.: NEVER TRUE

## 2024-05-31 ASSESSMENT — ENCOUNTER SYMPTOMS
ABDOMINAL PAIN: 0
SHORTNESS OF BREATH: 0
SORE THROAT: 0
NAUSEA: 0
COUGH: 0

## 2024-05-31 NOTE — PATIENT INSTRUCTIONS
[facility] Laboratory Locations - No appointment necessary.  ? indicates the location is open Saturdays in addition to Monday through Friday.   Call your preferred location for test preparation, business hours and other information you need.   [facility] accepts all insurances.  CENTRAL  EAST  [place]    ? [place]   [address]   [address]   [place] [address]    Ph: [phone]  [facility]   [address] [place] [address]    Ph: [phone]   ? [place]   [address]    [place] [address]    Ph: [phone]     [facility]   [address] [place] [address]    Ph: [phone] ? [address] [place] [address]   Ph: [phone]  ? [facility]   [address] [place] [address]    Ph: [phone]      [place]   [address] [place] [address]   Ph: [phone]    NORTH    ? [facility]   [address] [place] [address]    Ph: [phone]  [facility]   [address] [place] [address]   Ph: [phone]  [place]   [address] [facility] [address] [facility], [address]    PH: [phone]    [facility]   [address] [place] [address]    Ph: [phone]  [place]  [address] [place] [address]  Ph: [phone]  [facility]   [address] [place] [address]    Ph: [phone]

## 2024-05-31 NOTE — PROGRESS NOTES
[facility]  [facility] PRIMARY CARE  [address] [place] [address]  Dept: [phone]  Dept Fax: [phone]     2024      Tiffany ESPITIA Marta   1975     Chief Complaint   Patient presents with    Annual Exam     fasting       HPI  Pt comes in today for physical.         2024     6:51 AM 2023     9:46 AM 2022     9:21 AM 2019     2:44 PM 2019     1:44 PM 2014    12:53 PM   PHQ Scores   PHQ2 Score 0  0 0 0 0   PHQ9 Score 0 0 0 0 0 0     Interpretation of Total Score Depression Severity: 1-4 = Minimal depression, 5-9 = Mild depression, 10-14 = Moderate depression, 15-19 = Moderately severe depression, 20-27 = Severe depression     Prior to Visit Medications    Medication Sig Taking? Authorizing Provider   calcium carbonate 600 MG TABS tablet Take 1 tablet by mouth daily Yes Maryann Mcgee MD   Multiple Vitamins-Minerals (THERAPEUTIC MULTIVITAMIN-MINERALS) tablet Take 1 tablet by mouth daily Yes Maryann Mcgee MD   Omega-3 Fatty Acids (FISH OIL PO) Take by mouth daily Yes ProviderMaryann MD       Past Medical History:   Diagnosis Date    Abnormal Pap smear of cervix     Herpes simplex virus (HSV) infection         Social History     Tobacco Use    Smoking status: Never    Smokeless tobacco: Never   Vaping Use    Vaping Use: Never used   Substance Use Topics    Alcohol use: Yes     Alcohol/week: 1.0 standard drink of alcohol     Types: 1 Cans of beer per week     Comment: Only socially on occasion    Drug use: No        Past Surgical History:   Procedure Laterality Date     SECTION      x 2    COLONOSCOPY N/A 8/10/2022    COLONOSCOPY, POSSIBLE POLYPECTOMY performed by Jeffery Ulloa MD at [facility] ENDOSCOPY    DILATION AND CURETTAGE OF UTERUS N/A 2019    VIDEO HYSTEROSCOPY, DILATATION AND CURETTAGE, NOVASURE ENDOMETRIAL ABLATION performed by Marie Rolle DO at [facility] OR    TUBAL LIGATION      Ute DAHL

## 2024-06-21 DIAGNOSIS — Z00.00 ANNUAL PHYSICAL EXAM: ICD-10-CM

## 2024-06-21 LAB
ALBUMIN SERPL-MCNC: 4.4 G/DL (ref 3.4–5)
ALBUMIN/GLOB SERPL: 1.5 {RATIO} (ref 1.1–2.2)
ALP SERPL-CCNC: 82 U/L (ref 40–129)
ALT SERPL-CCNC: 17 U/L (ref 10–40)
ANION GAP SERPL CALCULATED.3IONS-SCNC: 12 MMOL/L (ref 3–16)
AST SERPL-CCNC: 17 U/L (ref 15–37)
BASOPHILS # BLD: 0.1 K/UL (ref 0–0.2)
BASOPHILS NFR BLD: 1.2 %
BILIRUB SERPL-MCNC: 0.3 MG/DL (ref 0–1)
BUN SERPL-MCNC: 14 MG/DL (ref 7–20)
CALCIUM SERPL-MCNC: 9.7 MG/DL (ref 8.3–10.6)
CHLORIDE SERPL-SCNC: 107 MMOL/L (ref 99–110)
CHOLEST SERPL-MCNC: 221 MG/DL (ref 0–199)
CO2 SERPL-SCNC: 23 MMOL/L (ref 21–32)
CREAT SERPL-MCNC: 1.2 MG/DL (ref 0.6–1.1)
DEPRECATED RDW RBC AUTO: 15.3 % (ref 12.4–15.4)
EOSINOPHIL # BLD: 0.2 K/UL (ref 0–0.6)
EOSINOPHIL NFR BLD: 2.2 %
GFR SERPLBLD CREATININE-BSD FMLA CKD-EPI: 56 ML/MIN/{1.73_M2}
GLUCOSE SERPL-MCNC: 100 MG/DL (ref 70–99)
HCT VFR BLD AUTO: 40.9 % (ref 36–48)
HDLC SERPL-MCNC: 45 MG/DL (ref 40–60)
HGB BLD-MCNC: 13.4 G/DL (ref 12–16)
LDLC SERPL CALC-MCNC: 152 MG/DL
LYMPHOCYTES # BLD: 2.9 K/UL (ref 1–5.1)
LYMPHOCYTES NFR BLD: 34.7 %
MCH RBC QN AUTO: 29.6 PG (ref 26–34)
MCHC RBC AUTO-ENTMCNC: 32.8 G/DL (ref 31–36)
MCV RBC AUTO: 90.2 FL (ref 80–100)
MONOCYTES # BLD: 0.7 K/UL (ref 0–1.3)
MONOCYTES NFR BLD: 7.8 %
NEUTROPHILS # BLD: 4.5 K/UL (ref 1.7–7.7)
NEUTROPHILS NFR BLD: 54.1 %
PLATELET # BLD AUTO: 304 K/UL (ref 135–450)
PMV BLD AUTO: 9.2 FL (ref 5–10.5)
POTASSIUM SERPL-SCNC: 4.5 MMOL/L (ref 3.5–5.1)
PROT SERPL-MCNC: 7.4 G/DL (ref 6.4–8.2)
RBC # BLD AUTO: 4.54 M/UL (ref 4–5.2)
SODIUM SERPL-SCNC: 142 MMOL/L (ref 136–145)
TRIGL SERPL-MCNC: 119 MG/DL (ref 0–150)
TSH SERPL DL<=0.005 MIU/L-ACNC: 2.36 UIU/ML (ref 0.27–4.2)
WBC # BLD AUTO: 8.4 K/UL (ref 4–11)

## 2024-06-24 DIAGNOSIS — N17.9 AKI (ACUTE KIDNEY INJURY) (HCC): Primary | ICD-10-CM

## 2024-06-24 PROBLEM — E78.2 MIXED HYPERLIPIDEMIA: Status: ACTIVE | Noted: 2024-06-24

## 2024-06-24 NOTE — RESULT ENCOUNTER NOTE
Please let pt know I have reviewed labs. Couple of things to highlight. Number 1 is kidney function. The current values are slightly abnormal. Likely, this is just hydration status at time of lab draw, but we need to repeat to ensure it is settling back into normal range. I have placed order. Get done NOT FASTING AND WELL HYDRATED in the next 1-2 weeks. The cholesterol shows elevation of total and LDL (bad cholesterol). This, along with her family history of heart disease, does concern me some. Below is a risk score calculator, that takes into account multiple other factors, and the results is low - this is good. Consider cholesterol lowering medication VS just monitoring with lifestyle improvements. Let me know.    The 10-year ASCVD risk score (Ofelia HICKS, et al., 2019) is: 1.2%    Values used to calculate the score:      Age: 48 years      Sex: Female      Is Non- : No      Diabetic: No      Tobacco smoker: No      Systolic Blood Pressure: 109 mmHg      Is BP treated: No      HDL Cholesterol: 45 mg/dL      Total Cholesterol: 221 mg/dL

## 2024-07-05 DIAGNOSIS — N17.9 AKI (ACUTE KIDNEY INJURY) (HCC): ICD-10-CM

## 2024-07-05 LAB
ANION GAP SERPL CALCULATED.3IONS-SCNC: 10 MMOL/L (ref 3–16)
BUN SERPL-MCNC: 16 MG/DL (ref 7–20)
CALCIUM SERPL-MCNC: 9.7 MG/DL (ref 8.3–10.6)
CHLORIDE SERPL-SCNC: 102 MMOL/L (ref 99–110)
CO2 SERPL-SCNC: 26 MMOL/L (ref 21–32)
CREAT SERPL-MCNC: 1.2 MG/DL (ref 0.6–1.1)
GFR SERPLBLD CREATININE-BSD FMLA CKD-EPI: 56 ML/MIN/{1.73_M2}
GLUCOSE SERPL-MCNC: 93 MG/DL (ref 70–99)
POTASSIUM SERPL-SCNC: 4.3 MMOL/L (ref 3.5–5.1)
SODIUM SERPL-SCNC: 138 MMOL/L (ref 136–145)

## 2024-07-08 DIAGNOSIS — R79.89 ELEVATED SERUM CREATININE: Primary | ICD-10-CM

## 2024-08-02 ENCOUNTER — HOSPITAL ENCOUNTER (OUTPATIENT)
Dept: MAMMOGRAPHY | Age: 49
Discharge: HOME | End: 2024-08-02
Attending: FAMILY MEDICINE
Payer: COMMERCIAL

## 2024-08-02 VITALS — BODY MASS INDEX: 31.91 KG/M2 | WEIGHT: 169 LBS | HEIGHT: 61 IN

## 2024-08-02 DIAGNOSIS — Z12.31 BREAST CANCER SCREENING BY MAMMOGRAM: ICD-10-CM

## 2024-08-02 DIAGNOSIS — Z12.31 VISIT FOR SCREENING MAMMOGRAM: ICD-10-CM

## 2024-08-02 PROCEDURE — 77063 BREAST TOMOSYNTHESIS BI: CPT

## 2024-09-09 ENCOUNTER — OFFICE VISIT (OUTPATIENT)
Dept: OBGYN CLINIC | Age: 49
End: 2024-09-09
Payer: COMMERCIAL

## 2024-09-09 VITALS
SYSTOLIC BLOOD PRESSURE: 124 MMHG | DIASTOLIC BLOOD PRESSURE: 86 MMHG | HEIGHT: 61 IN | TEMPERATURE: 98.6 F | WEIGHT: 172.2 LBS | BODY MASS INDEX: 32.51 KG/M2 | HEART RATE: 71 BPM

## 2024-09-09 DIAGNOSIS — Z98.890 S/P ENDOMETRIAL ABLATION: ICD-10-CM

## 2024-09-09 DIAGNOSIS — Z01.419 ENCNTR FOR GYN EXAM (GENERAL) (ROUTINE) W/O ABN FINDINGS: Primary | ICD-10-CM

## 2024-09-09 DIAGNOSIS — N93.9 ABNORMAL UTERINE BLEEDING (AUB): ICD-10-CM

## 2024-09-09 DIAGNOSIS — Z12.4 PAP SMEAR FOR CERVICAL CANCER SCREENING: ICD-10-CM

## 2024-09-09 PROCEDURE — 99396 PREV VISIT EST AGE 40-64: CPT | Performed by: OBSTETRICS & GYNECOLOGY

## 2024-09-09 ASSESSMENT — ENCOUNTER SYMPTOMS
DIARRHEA: 0
SHORTNESS OF BREATH: 0
SORE THROAT: 0
VOMITING: 0
ABDOMINAL PAIN: 0
COUGH: 0
NAUSEA: 0
CONSTIPATION: 0

## 2024-10-28 ENCOUNTER — TELEPHONE (OUTPATIENT)
Dept: OBGYN CLINIC | Age: 49
End: 2024-10-28

## 2024-10-28 NOTE — TELEPHONE ENCOUNTER
Patient called today to reschedule her appointment she had this afternoon. Unable to do that time. Rescheduled to 11/12/2024

## 2024-11-11 ENCOUNTER — TELEPHONE (OUTPATIENT)
Dept: OBGYN CLINIC | Age: 49
End: 2024-11-11

## 2024-11-11 DIAGNOSIS — N93.9 ABNORMAL UTERINE BLEEDING (AUB): Primary | ICD-10-CM

## 2024-11-11 NOTE — TELEPHONE ENCOUNTER
Patient unable to do later time as she has to get back to work. She would like to do an out patient ultrasound and then follow up in office. Please advise.

## 2024-11-13 NOTE — TELEPHONE ENCOUNTER
Patient notified of apology. Aware to call [facility] central scheduling to schedule appointment and to call office back to set up appointment for discussion with Dr. Marr

## 2024-11-15 ENCOUNTER — HOSPITAL ENCOUNTER (OUTPATIENT)
Dept: ULTRASOUND IMAGING | Age: 49
Discharge: HOME | End: 2024-11-15
Payer: COMMERCIAL

## 2024-11-15 DIAGNOSIS — N93.9 ABNORMAL UTERINE BLEEDING (AUB): ICD-10-CM

## 2024-11-15 PROCEDURE — 76856 US EXAM PELVIC COMPLETE: CPT

## 2024-12-12 ENCOUNTER — OFFICE VISIT (OUTPATIENT)
Dept: OBGYN CLINIC | Age: 49
End: 2024-12-12
Payer: COMMERCIAL

## 2024-12-12 ENCOUNTER — TELEPHONE (OUTPATIENT)
Dept: OBGYN CLINIC | Age: 49
End: 2024-12-12

## 2024-12-12 VITALS
SYSTOLIC BLOOD PRESSURE: 138 MMHG | WEIGHT: 172.8 LBS | HEART RATE: 69 BPM | DIASTOLIC BLOOD PRESSURE: 74 MMHG | BODY MASS INDEX: 32.65 KG/M2

## 2024-12-12 DIAGNOSIS — Z98.890 S/P ENDOMETRIAL ABLATION: ICD-10-CM

## 2024-12-12 DIAGNOSIS — N94.6 DYSMENORRHEA: Primary | ICD-10-CM

## 2024-12-12 PROCEDURE — 99213 OFFICE O/P EST LOW 20 MIN: CPT | Performed by: OBSTETRICS & GYNECOLOGY

## 2024-12-12 NOTE — PROGRESS NOTES
Return Gyn Office Visit    CC:   Chief Complaint   Patient presents with    Other       HPI:  Tiffany Lozano is a 49 y.o. female who presents to discuss hysterectomy.     Patient is seen and examined today. Overall doing well today.    Patient with a history of endometrial ablation in 2019.  Has continued to have menses following, occurring monthly and lasting 3-5 days.  Moderate flow, changing pads/tampons every 3-4 hours.  Every 3 months she has increased pain with menses.  Would like to discuss definitive interventions for menses.     Denies chest pain, shortness of breath, fever, chills, nausea, vomiting.       Review of Systems - The following ROS was otherwise negative, except as noted in the HPI: constitutional, respiratory, cardiovascular, gastrointestinal, genitourinary    Objective:  /74   Pulse 69   Wt 78.4 kg (172 lb 12.8 oz)   BMI 32.65 kg/m²     Physical Exam  Vitals reviewed.   Constitutional:       General: She is not in acute distress.     Appearance: She is well-developed.   HENT:      Head: Normocephalic and atraumatic.   Eyes:      Conjunctiva/sclera: Conjunctivae normal.   Cardiovascular:      Rate and Rhythm: Normal rate.   Pulmonary:      Effort: Pulmonary effort is normal. No respiratory distress.   Abdominal:      General: There is no distension.      Palpations: Abdomen is soft.      Tenderness: There is no abdominal tenderness. There is no guarding or rebound.   Musculoskeletal:         General: No swelling.   Skin:     General: Skin is warm and dry.   Neurological:      Mental Status: She is alert and oriented to person, place, and time.   Psychiatric:         Mood and Affect: Mood normal.         Behavior: Behavior normal.         Thought Content: Thought content normal.       Assessment/Plan:     Tiffany Lozano is a 49 y.o. female who presents to discuss Hysterectomy    1. Dysmenorrhea     - Patient is s/p endometrial ablation and continues to have regular menses     - Reports

## 2025-01-10 ENCOUNTER — OFFICE VISIT (OUTPATIENT)
Dept: PRIMARY CARE CLINIC | Age: 50
End: 2025-01-10
Payer: COMMERCIAL

## 2025-01-10 ENCOUNTER — PREP FOR PROCEDURE (OUTPATIENT)
Dept: OBGYN CLINIC | Age: 50
End: 2025-01-10

## 2025-01-10 VITALS
DIASTOLIC BLOOD PRESSURE: 70 MMHG | OXYGEN SATURATION: 97 % | WEIGHT: 168.4 LBS | BODY MASS INDEX: 31.79 KG/M2 | TEMPERATURE: 98.1 F | SYSTOLIC BLOOD PRESSURE: 118 MMHG | HEIGHT: 61 IN | HEART RATE: 67 BPM

## 2025-01-10 DIAGNOSIS — N92.0 MENORRHAGIA WITH REGULAR CYCLE: Primary | ICD-10-CM

## 2025-01-10 DIAGNOSIS — Z01.818 PREOPERATIVE EXAMINATION: ICD-10-CM

## 2025-01-10 PROCEDURE — 99213 OFFICE O/P EST LOW 20 MIN: CPT | Performed by: FAMILY MEDICINE

## 2025-01-10 RX ORDER — SODIUM CHLORIDE 0.9 % (FLUSH) 0.9 %
5-40 SYRINGE (ML) INJECTION EVERY 12 HOURS SCHEDULED
Status: CANCELLED | OUTPATIENT
Start: 2025-01-10

## 2025-01-10 RX ORDER — SODIUM CHLORIDE 9 MG/ML
INJECTION, SOLUTION INTRAVENOUS PRN
Status: CANCELLED | OUTPATIENT
Start: 2025-01-10

## 2025-01-10 RX ORDER — SODIUM CHLORIDE 0.9 % (FLUSH) 0.9 %
5-40 SYRINGE (ML) INJECTION PRN
Status: CANCELLED | OUTPATIENT
Start: 2025-01-10

## 2025-01-10 SDOH — ECONOMIC STABILITY: FOOD INSECURITY: WITHIN THE PAST 12 MONTHS, THE FOOD YOU BOUGHT JUST DIDN'T LAST AND YOU DIDN'T HAVE MONEY TO GET MORE.: NEVER TRUE

## 2025-01-10 SDOH — ECONOMIC STABILITY: FOOD INSECURITY: WITHIN THE PAST 12 MONTHS, YOU WORRIED THAT YOUR FOOD WOULD RUN OUT BEFORE YOU GOT MONEY TO BUY MORE.: NEVER TRUE

## 2025-01-10 ASSESSMENT — ENCOUNTER SYMPTOMS
COUGH: 0
SORE THROAT: 0
NAUSEA: 0
ABDOMINAL PAIN: 0
SHORTNESS OF BREATH: 0

## 2025-01-10 ASSESSMENT — PATIENT HEALTH QUESTIONNAIRE - PHQ9
1. LITTLE INTEREST OR PLEASURE IN DOING THINGS: NOT AT ALL
2. FEELING DOWN, DEPRESSED OR HOPELESS: NOT AT ALL
SUM OF ALL RESPONSES TO PHQ QUESTIONS 1-9: 0
SUM OF ALL RESPONSES TO PHQ9 QUESTIONS 1 & 2: 0
SUM OF ALL RESPONSES TO PHQ QUESTIONS 1-9: 0

## 2025-01-10 NOTE — PROGRESS NOTES
[facility]  [facility] CARE  [address] [place] [address]  Dept: [phone]  Dept Fax: [phone]     1/10/2025      Tiffany Lozano   1975     Chief Complaint   Patient presents with    Pre-op Exam     Hysterectomy, 1.25, Carola Garcia, Dr Marr       HPI  Pt comes in today for preop. Scheduled for hysterectomy with Dr Marr. She has had multiple procedures/surgeries in past without complication.        1/10/2025     8:52 AM 2024     6:51 AM 2023     9:46 AM 2022     9:21 AM 2019     2:44 PM 2019     1:44 PM 2014    12:53 PM   PHQ Scores   PHQ2 Score 0 0  0 0 0 0   PHQ9 Score 0 0 0 0 0 0 0     Interpretation of Total Score Depression Severity: 1-4 = Minimal depression, 5-9 = Mild depression, 10-14 = Moderate depression, 15-19 = Moderately severe depression, 20-27 = Severe depression     Prior to Visit Medications    Medication Sig Taking? Authorizing Provider   calcium carbonate 600 MG TABS tablet Take 1 tablet by mouth daily Yes Maryann Mcgee MD   Multiple Vitamins-Minerals (THERAPEUTIC MULTIVITAMIN-MINERALS) tablet Take 1 tablet by mouth daily Yes Maryann Mcgee MD   Omega-3 Fatty Acids (FISH OIL PO) Take by mouth daily Yes Maryann Mcgee MD       Past Medical History:   Diagnosis Date    Abnormal Pap smear of cervix     Herpes simplex virus (HSV) infection     Mixed hyperlipidemia 2024        Social History     Tobacco Use    Smoking status: Never    Smokeless tobacco: Never   Vaping Use    Vaping status: Never Used   Substance Use Topics    Alcohol use: Yes     Alcohol/week: 1.0 standard drink of alcohol     Types: 1 Cans of beer per week     Comment: Only socially on occasion    Drug use: No        Past Surgical History:   Procedure Laterality Date     SECTION      x 2    COLONOSCOPY N/A 8/10/2022    COLONOSCOPY, POSSIBLE POLYPECTOMY performed by Jeffery Ulloa MD at [facility] ENDOSCOPY    DILATION

## 2025-01-10 NOTE — PATIENT INSTRUCTIONS
[facility] Laboratory Locations - No appointment necessary.  ? indicates the location is open Saturdays in addition to Monday through Friday.   Call your preferred location for test preparation, business hours and other information you need.   [facility] accepts all insurances.  CENTRAL  EAST  [place]    ? [place]   [address]   [address]   [place] [address]    Ph: [phone]  [facility]   [address] [place] [address]    Ph: [phone]   ? [place]   [address]    [place] [address]    Ph: [phone]     [facility]   [address] [place] [address]    Ph: [phone] ? [address] [place] [address]   Ph: [phone]  ? [facility]   [address] [place] [address]    Ph: [phone]      [place]   [address] [place] [address]   Ph: [phone]    NORTH    ? [facility]   [address] [facility] [place] [address]    Ph: [phone]  [facility]   [address] [place] [address]   Ph: [phone]  [place]   [address] [facility] [address], [address]    PH: [phone]    [facility]   [address] [place] [address]    Ph: [phone]  [place]  [address] [place] [address]  Ph: [phone]  [facility]   [address] [place] [address]    Ph: [phone]

## 2025-01-22 ENCOUNTER — OFFICE VISIT (OUTPATIENT)
Dept: OBGYN CLINIC | Age: 50
End: 2025-01-22
Payer: COMMERCIAL

## 2025-01-22 VITALS
BODY MASS INDEX: 32.1 KG/M2 | OXYGEN SATURATION: 98 % | WEIGHT: 170 LBS | DIASTOLIC BLOOD PRESSURE: 82 MMHG | HEIGHT: 61 IN | SYSTOLIC BLOOD PRESSURE: 126 MMHG | HEART RATE: 50 BPM | TEMPERATURE: 98.2 F

## 2025-01-22 DIAGNOSIS — N93.9 ABNORMAL UTERINE BLEEDING (AUB): ICD-10-CM

## 2025-01-22 DIAGNOSIS — N94.6 DYSMENORRHEA: Primary | ICD-10-CM

## 2025-01-22 DIAGNOSIS — Z98.890 S/P ENDOMETRIAL ABLATION: ICD-10-CM

## 2025-01-22 LAB
CONTROL: NORMAL
PREGNANCY TEST URINE, POC: NEGATIVE

## 2025-01-22 PROCEDURE — 81025 URINE PREGNANCY TEST: CPT | Performed by: OBSTETRICS & GYNECOLOGY

## 2025-01-22 PROCEDURE — 58100 BIOPSY OF UTERUS LINING: CPT | Performed by: OBSTETRICS & GYNECOLOGY

## 2025-01-27 ENCOUNTER — TELEPHONE (OUTPATIENT)
Dept: OBGYN CLINIC | Age: 50
End: 2025-01-27

## 2025-01-27 NOTE — TELEPHONE ENCOUNTER
With Alana being off today this is scheduled for tomorrow so we do need to find out what is going on.  Thank you.

## 2025-01-27 NOTE — TELEPHONE ENCOUNTER
Pt called in wanting an update of what is happening with her surgery. Pt requesting to speak with Flory.

## 2025-01-27 NOTE — TELEPHONE ENCOUNTER
Received call from Megan quiles [facility] .The PA for CPT code 39128 is noted as \" pending\" The surgery scheduling sheet in epic says approved but no reference number or approval number is noted. She states she will try to call pt's Insurance [employer] to see if this can be approved. She will call the office back to see how we would like to proceed if unable to get approval. Pt is scheduled for 1/28/25.

## 2025-01-27 NOTE — TELEPHONE ENCOUNTER
LVM for pt to cancel surgery and will reschedule as soon as possible. Will contact again before the end of the day.

## 2025-01-27 NOTE — TELEPHONE ENCOUNTER
Received call back from Megan. The status of code 94964 is still pending. Please advise on how to proceed.   Call back for Megan is [phone] ext [phone]

## 2025-01-27 NOTE — TELEPHONE ENCOUNTER
Spoke with PT and apologized for the situation and that surgery had to be cancelled I am actively working with [employer] to figure out how and why this happened. Informed PT the moment I get this figured out I will call and reschedule with the soonest date possible.

## 2025-01-27 NOTE — TELEPHONE ENCOUNTER
Logged in and working on this now I'm not sure how this happened I have a ref number [id number]. Calling [employer] now will also work with the OR and PT to reschedule if needed.

## 2025-01-31 NOTE — TELEPHONE ENCOUNTER
Spoke with Jorge at [facility] surgery is approved ref #[id number]. Spoke with PT scheduled surgery on 2/18/25 at 9am with  and  to assist. Post op scheduled. Worksheet given to

## 2025-02-03 NOTE — PROGRESS NOTES
Endometrial Biopsy Procedure Note      Indication: AUB, dysmenorrhea, pre-op planning    Urine pregnancy test: Negative    Consent:  The risks of the procedure were discussed with the patient. She was aware these risks include, but are not limited to bleeding, infection and damage to surrounding tissue. She elected to proceed with the endometrial biopsy. Written consent was obtained.    Procedure Details:   The patient was placed in the dorsal lithotomy position. A speculum was placed in the vagina, and the cervix was cleansed with betadine. A tenaculum was placed on the anterior lip of the cervix. The cervix was not dilated. An endometrial pipelle was advanced to the fundus and a sample was gently collected.  The uterus sounded to 6 cm.   Specimen was not adequate after 2 attempt(s). Suspect cervical specimen only due to palpable inability to pass the internal cervical os secondary to ablation.  The tenaculum was removed and adequate hemostasis was noted.     Patient tolerated the procedure well.     EBL: Minimal    Complications: None    Plan:   Patient was instructed to take OTC motrin and/or tylenol for pain control  After care instructions discussed.   Reviewed hysterectomy risks with suspected inability to obtain endometrial tissues and risks of underlying malignancy - patient desires to proceed with current plan for LAVH, BS  Will follow up with results and treatment plan.    Marie Marr DO

## 2025-02-03 NOTE — PROGRESS NOTES
Tiffany ESPITIA Lozano    Age 49 y.o.    female    1975    MRN [id number]    2/18/2025  Arrival Time_____________  OR Time____________195 Min     Procedure(s):  LAPAROSCOPIC ASSISTED VAGINAL HYSTERECTOMY, BILATERAL SALPINGECTOMY                      General   Surgeon(s):  Marie Marr, DO  Claudio, Cassie L, DO      DAY ADMIT ___  SDS/OP ___  OUTPT IN BED ___        Phone [phone] (home) [phone] (work)                 PCP _____________________ Phone_________________ Epic ( ) Epic CE ( ) Appt ________    NOTES: _________________________________________ Consult/Cardio _______________    ____________________________________________________________________________    ____________________________________________________________________________  PAT APPT DATE:________ TIME: ________  FAXED QAD: _______  (__) H&P w/ Hospitalist    (__) PAT orders in EPIC    (__) Meet with PAT nurse  __________________________________________________________________________  Preop Nurse phone screen complete: _____________  (__) CBC     (__) W/ DIFF ___________  (__) CT CHEST  __________   (__) Hgb A1C    ___________  (__) CHEST X RAY   __________  (__) LIPID PROFILE  ___________  (__) EKG   __________  (__) PT-INR / APTT  ___________  (__) PFT's   __________  (__) BMP   ___________  (__) CAROTIDS  __________  (__) CMP   ___________  (__) VEIN MAPPING  __________  (__) U/A   ___________  ( X ) HISTORY & PHYSICAL __________  (__) URINE C & S  ___________  (__) CARDIAC CLEARANCE __________  (__) U/A W/ FLEX  ___________  (__) PULM. CLEARANCE __________  (__) SERUM PREGNANCY ___________  (__) Preop Orders in EPIC __________  (__) TYPE & SCREEN __________repeat ( ) (__)  __________________ __________  (__) Albumin   ___________  (__)  __________________ __________  (__) TRANSFERRIN  ___________  (__)  __________________ __________  (__) LIVER PROFILE  ___________  (__) URINE PREG DOS __________  (__) MRSA NASAL

## 2025-02-03 NOTE — PROGRESS NOTES
Return Gyn Office Visit    CC:   Chief Complaint   Patient presents with    Pre-op Exam       HPI:  Tiffany Lozano is a 49 y.o. female who presents to sign consents for upcoming Laparoscopic assisted vaginal hysterectomy with bilateral salpingectomy.    Patient is seen and examined today. Overall doing well today.    Patient with a history of endometrial ablation in 2019.  Has continued to have menses following, occurring monthly and lasting 3-5 days.  Moderate flow, changing pads/tampons every 3-4 hours.  Every 3 months she has increased pain with menses.  Would like to proceed with definitive interventions for menses.     Denies chest pain, shortness of breath, fever, chills, nausea, vomiting.       Review of Systems - The following ROS was otherwise negative, except as noted in the HPI: constitutional, respiratory, cardiovascular, gastrointestinal, genitourinary    Objective:  /82   Pulse 50   Temp 98.2 °F (36.8 °C) (Temporal)   Ht 1.549 m (5' 1\")   Wt 77.1 kg (170 lb)   LMP 01/18/2025   SpO2 98%   BMI 32.12 kg/m²     Physical Exam  Vitals reviewed.   Constitutional:       General: She is not in acute distress.     Appearance: She is well-developed.   HENT:      Head: Normocephalic and atraumatic.   Eyes:      Conjunctiva/sclera: Conjunctivae normal.   Cardiovascular:      Rate and Rhythm: Normal rate.   Pulmonary:      Effort: Pulmonary effort is normal. No respiratory distress.   Abdominal:      General: There is no distension.      Palpations: Abdomen is soft.      Tenderness: There is no abdominal tenderness. There is no guarding or rebound.   Musculoskeletal:         General: No swelling.   Skin:     General: Skin is warm and dry.   Neurological:      Mental Status: She is alert and oriented to person, place, and time.   Psychiatric:         Mood and Affect: Mood normal.         Behavior: Behavior normal.         Thought Content: Thought content normal.       Assessment/Plan:     Tiffany Lozano is a

## 2025-02-09 RX ORDER — SODIUM CHLORIDE 0.9 % (FLUSH) 0.9 %
5-40 SYRINGE (ML) INJECTION PRN
Status: CANCELLED | OUTPATIENT
Start: 2025-02-09

## 2025-02-09 RX ORDER — SODIUM CHLORIDE 9 MG/ML
INJECTION, SOLUTION INTRAVENOUS PRN
Status: CANCELLED | OUTPATIENT
Start: 2025-02-09

## 2025-02-09 RX ORDER — SODIUM CHLORIDE 0.9 % (FLUSH) 0.9 %
5-40 SYRINGE (ML) INJECTION EVERY 12 HOURS SCHEDULED
Status: CANCELLED | OUTPATIENT
Start: 2025-02-09

## 2025-02-10 ENCOUNTER — TELEPHONE (OUTPATIENT)
Dept: PRIMARY CARE CLINIC | Age: 50
End: 2025-02-10

## 2025-02-10 NOTE — TELEPHONE ENCOUNTER
I spoke with Dr Marr's surgery scheduler.  They will accept a note along with the 1/10/25 visit.    Fax number [phone]    Appointment for today cancelled

## 2025-02-12 NOTE — PROGRESS NOTES
Surgery Date and Time:     2/18/25 @ 9:15 am    Arrival Time:    7:15 am        The instructions given when and if a patient needs to stop oral intake prior to surgery varies. Follow the instructions you were      given by your Surgeon or RN during the Pre-op call.      __X__Do not eat or drink anything after Midnight the night before the surgery. NO gum, mints, candy or ice chips the day of surgery.        Only take the following medications with a small sip of water the morning of surgery:    NONE        Aspirin, Ibuprofen, Advil, Naproxen, Vitamin E and other Anti-inflammatory products and supplements should be stopped       for 5 -7days before surgery or as directed by your physician.     - Do not smoke or vape, and do not drink any alcoholic beverages 24 hours prior to surgery, this includes NA Beer. Refrain from using     any recreational drugs, including non-prescribed prescription drugs.     -You may brush your teeth and gargle the morning of surgery.  DO NOT SWALLOW WATER.    -You MUST plan for a responsible adult to stay on site while you are here and take you home after your surgery. You will not be allowed                to leave alone or drive yourself home. It is requested someone stay with you the first 24 hrs. Your surgery will be cancelled if you do not                have a ride home with a responsible adult.    -Please wear simple, loose-fitting clothing to the hospital. Do not bring valuables (money, credit cards, checkbooks, etc.)                Do not wear any makeup (including no eye makeup) and no nail polish if applicable.             - DO NOT wear any jewelry or body piercings day of surgery.  All body piercing jewelry must be removed.             - If you have dentures they will be removed before going to the OR; we will provide a container.  If you wear contact lenses                or glasses they will be removed, bring a case for them or wear glasses day of surgery.             -

## 2025-02-17 ENCOUNTER — ANESTHESIA EVENT (OUTPATIENT)
Dept: OPERATING ROOM | Age: 50
End: 2025-02-17
Payer: COMMERCIAL

## 2025-02-18 ENCOUNTER — HOSPITAL ENCOUNTER (OUTPATIENT)
Age: 50
Discharge: HOME | End: 2025-02-19
Attending: OBSTETRICS & GYNECOLOGY | Admitting: OBSTETRICS & GYNECOLOGY
Payer: COMMERCIAL

## 2025-02-18 ENCOUNTER — ANESTHESIA (OUTPATIENT)
Dept: OPERATING ROOM | Age: 50
End: 2025-02-18
Payer: COMMERCIAL

## 2025-02-18 DIAGNOSIS — N94.6 DYSMENORRHEA: ICD-10-CM

## 2025-02-18 DIAGNOSIS — Z98.890 STATUS POST ENDOMETRIAL ABLATION: ICD-10-CM

## 2025-02-18 DIAGNOSIS — Z90.710 S/P LAPAROSCOPIC ASSISTED VAGINAL HYSTERECTOMY (LAVH): Primary | ICD-10-CM

## 2025-02-18 LAB
ABO + RH BLD: NORMAL
ALBUMIN SERPL-MCNC: 4 G/DL (ref 3.4–5)
ALBUMIN/GLOB SERPL: 1.1 {RATIO} (ref 1.1–2.2)
ALP SERPL-CCNC: 99 U/L (ref 40–129)
ALT SERPL-CCNC: 24 U/L (ref 10–40)
ANION GAP SERPL CALCULATED.3IONS-SCNC: 12 MMOL/L (ref 3–16)
AST SERPL-CCNC: 22 U/L (ref 15–37)
BASOPHILS # BLD: 0.1 K/UL (ref 0–0.2)
BASOPHILS NFR BLD: 0.9 %
BILIRUB SERPL-MCNC: <0.2 MG/DL (ref 0–1)
BLD GP AB SCN SERPL QL: NORMAL
BUN SERPL-MCNC: 17 MG/DL (ref 7–20)
CALCIUM SERPL-MCNC: 9.2 MG/DL (ref 8.3–10.6)
CHLORIDE SERPL-SCNC: 105 MMOL/L (ref 99–110)
CO2 SERPL-SCNC: 22 MMOL/L (ref 21–32)
CREAT SERPL-MCNC: 0.9 MG/DL (ref 0.6–1.1)
DEPRECATED RDW RBC AUTO: 13.1 % (ref 12.4–15.4)
EOSINOPHIL # BLD: 0.3 K/UL (ref 0–0.6)
EOSINOPHIL NFR BLD: 2.5 %
GFR SERPLBLD CREATININE-BSD FMLA CKD-EPI: 78 ML/MIN/{1.73_M2}
GLUCOSE SERPL-MCNC: 114 MG/DL (ref 70–99)
HCG UR QL: NEGATIVE
HCT VFR BLD AUTO: 40.8 % (ref 36–48)
HCT VFR BLD AUTO: 41.1 % (ref 36–48)
HGB BLD-MCNC: 13.5 G/DL (ref 12–16)
HGB BLD-MCNC: 13.6 G/DL (ref 12–16)
LYMPHOCYTES # BLD: 4.3 K/UL (ref 1–5.1)
LYMPHOCYTES NFR BLD: 32.7 %
MCH RBC QN AUTO: 28.7 PG (ref 26–34)
MCHC RBC AUTO-ENTMCNC: 33.1 G/DL (ref 31–36)
MCV RBC AUTO: 86.6 FL (ref 80–100)
MONOCYTES # BLD: 1 K/UL (ref 0–1.3)
MONOCYTES NFR BLD: 7.5 %
NEUTROPHILS # BLD: 7.5 K/UL (ref 1.7–7.7)
NEUTROPHILS NFR BLD: 56.4 %
PLATELET # BLD AUTO: 335 K/UL (ref 135–450)
PMV BLD AUTO: 7.9 FL (ref 5–10.5)
POTASSIUM SERPL-SCNC: 4 MMOL/L (ref 3.5–5.1)
PROT SERPL-MCNC: 7.7 G/DL (ref 6.4–8.2)
RBC # BLD AUTO: 4.75 M/UL (ref 4–5.2)
SODIUM SERPL-SCNC: 139 MMOL/L (ref 136–145)
WBC # BLD AUTO: 13.3 K/UL (ref 4–11)

## 2025-02-18 PROCEDURE — 6360000002 HC RX W HCPCS

## 2025-02-18 PROCEDURE — 36415 COLL VENOUS BLD VENIPUNCTURE: CPT

## 2025-02-18 PROCEDURE — 85018 HEMOGLOBIN: CPT

## 2025-02-18 PROCEDURE — 7100000001 HC PACU RECOVERY - ADDTL 15 MIN: Performed by: OBSTETRICS & GYNECOLOGY

## 2025-02-18 PROCEDURE — 86900 BLOOD TYPING SEROLOGIC ABO: CPT

## 2025-02-18 PROCEDURE — 6360000002 HC RX W HCPCS: Performed by: OBSTETRICS & GYNECOLOGY

## 2025-02-18 PROCEDURE — 58571 TLH W/T/O 250 G OR LESS: CPT | Performed by: OBSTETRICS & GYNECOLOGY

## 2025-02-18 PROCEDURE — 2580000003 HC RX 258

## 2025-02-18 PROCEDURE — 2580000003 HC RX 258: Performed by: OBSTETRICS & GYNECOLOGY

## 2025-02-18 PROCEDURE — 86850 RBC ANTIBODY SCREEN: CPT

## 2025-02-18 PROCEDURE — 6370000000 HC RX 637 (ALT 250 FOR IP): Performed by: OBSTETRICS & GYNECOLOGY

## 2025-02-18 PROCEDURE — 88307 TISSUE EXAM BY PATHOLOGIST: CPT

## 2025-02-18 PROCEDURE — 6370000000 HC RX 637 (ALT 250 FOR IP): Performed by: ANESTHESIOLOGY

## 2025-02-18 PROCEDURE — 3600000004 HC SURGERY LEVEL 4 BASE: Performed by: OBSTETRICS & GYNECOLOGY

## 2025-02-18 PROCEDURE — 80053 COMPREHEN METABOLIC PANEL: CPT

## 2025-02-18 PROCEDURE — 2500000003 HC RX 250 WO HCPCS: Performed by: OBSTETRICS & GYNECOLOGY

## 2025-02-18 PROCEDURE — 3600000014 HC SURGERY LEVEL 4 ADDTL 15MIN: Performed by: OBSTETRICS & GYNECOLOGY

## 2025-02-18 PROCEDURE — 86901 BLOOD TYPING SEROLOGIC RH(D): CPT

## 2025-02-18 PROCEDURE — 85014 HEMATOCRIT: CPT

## 2025-02-18 PROCEDURE — 7100000000 HC PACU RECOVERY - FIRST 15 MIN: Performed by: OBSTETRICS & GYNECOLOGY

## 2025-02-18 PROCEDURE — 85025 COMPLETE CBC W/AUTO DIFF WBC: CPT

## 2025-02-18 PROCEDURE — 2500000003 HC RX 250 WO HCPCS

## 2025-02-18 PROCEDURE — 3700000001 HC ADD 15 MINUTES (ANESTHESIA): Performed by: OBSTETRICS & GYNECOLOGY

## 2025-02-18 PROCEDURE — 84703 CHORIONIC GONADOTROPIN ASSAY: CPT

## 2025-02-18 PROCEDURE — 3700000000 HC ANESTHESIA ATTENDED CARE: Performed by: OBSTETRICS & GYNECOLOGY

## 2025-02-18 PROCEDURE — 2709999900 HC NON-CHARGEABLE SUPPLY: Performed by: OBSTETRICS & GYNECOLOGY

## 2025-02-18 PROCEDURE — 2720000010 HC SURG SUPPLY STERILE: Performed by: OBSTETRICS & GYNECOLOGY

## 2025-02-18 RX ORDER — SIMETHICONE 80 MG
80 TABLET,CHEWABLE ORAL EVERY 6 HOURS PRN
Status: DISCONTINUED | OUTPATIENT
Start: 2025-02-18 | End: 2025-02-19 | Stop reason: HOSPADM

## 2025-02-18 RX ORDER — MEPERIDINE HYDROCHLORIDE 50 MG/ML
12.5 INJECTION INTRAMUSCULAR; INTRAVENOUS; SUBCUTANEOUS EVERY 5 MIN PRN
Status: DISCONTINUED | OUTPATIENT
Start: 2025-02-18 | End: 2025-02-18 | Stop reason: HOSPADM

## 2025-02-18 RX ORDER — SODIUM CHLORIDE 0.9 % (FLUSH) 0.9 %
5-40 SYRINGE (ML) INJECTION EVERY 12 HOURS SCHEDULED
Status: DISCONTINUED | OUTPATIENT
Start: 2025-02-18 | End: 2025-02-18 | Stop reason: HOSPADM

## 2025-02-18 RX ORDER — SODIUM CHLORIDE 0.9 % (FLUSH) 0.9 %
5-40 SYRINGE (ML) INJECTION EVERY 12 HOURS SCHEDULED
Status: DISCONTINUED | OUTPATIENT
Start: 2025-02-18 | End: 2025-02-19 | Stop reason: HOSPADM

## 2025-02-18 RX ORDER — ACETAMINOPHEN 500 MG
1000 TABLET ORAL EVERY 8 HOURS PRN
Status: DISCONTINUED | OUTPATIENT
Start: 2025-02-18 | End: 2025-02-19 | Stop reason: HOSPADM

## 2025-02-18 RX ORDER — SODIUM CHLORIDE 0.9 % (FLUSH) 0.9 %
5-40 SYRINGE (ML) INJECTION PRN
Status: DISCONTINUED | OUTPATIENT
Start: 2025-02-18 | End: 2025-02-18 | Stop reason: HOSPADM

## 2025-02-18 RX ORDER — SODIUM CHLORIDE 9 MG/ML
INJECTION, SOLUTION INTRAVENOUS PRN
Status: DISCONTINUED | OUTPATIENT
Start: 2025-02-18 | End: 2025-02-18 | Stop reason: HOSPADM

## 2025-02-18 RX ORDER — ONDANSETRON 2 MG/ML
INJECTION INTRAMUSCULAR; INTRAVENOUS
Status: DISCONTINUED | OUTPATIENT
Start: 2025-02-18 | End: 2025-02-18 | Stop reason: SDUPTHER

## 2025-02-18 RX ORDER — OXYCODONE HYDROCHLORIDE 5 MG/1
5 TABLET ORAL PRN
Status: DISCONTINUED | OUTPATIENT
Start: 2025-02-18 | End: 2025-02-18 | Stop reason: HOSPADM

## 2025-02-18 RX ORDER — LIDOCAINE HYDROCHLORIDE 20 MG/ML
INJECTION, SOLUTION EPIDURAL; INFILTRATION; INTRACAUDAL; PERINEURAL
Status: DISCONTINUED | OUTPATIENT
Start: 2025-02-18 | End: 2025-02-18 | Stop reason: SDUPTHER

## 2025-02-18 RX ORDER — SODIUM CHLORIDE 0.9 % (FLUSH) 0.9 %
5-40 SYRINGE (ML) INJECTION PRN
Status: DISCONTINUED | OUTPATIENT
Start: 2025-02-18 | End: 2025-02-19 | Stop reason: HOSPADM

## 2025-02-18 RX ORDER — SODIUM CHLORIDE, SODIUM LACTATE, POTASSIUM CHLORIDE, CALCIUM CHLORIDE 600; 310; 30; 20 MG/100ML; MG/100ML; MG/100ML; MG/100ML
INJECTION, SOLUTION INTRAVENOUS CONTINUOUS
Status: DISCONTINUED | OUTPATIENT
Start: 2025-02-18 | End: 2025-02-18 | Stop reason: HOSPADM

## 2025-02-18 RX ORDER — OXYCODONE HYDROCHLORIDE 5 MG/1
10 TABLET ORAL PRN
Status: DISCONTINUED | OUTPATIENT
Start: 2025-02-18 | End: 2025-02-18 | Stop reason: HOSPADM

## 2025-02-18 RX ORDER — ONDANSETRON 4 MG/1
4 TABLET, ORALLY DISINTEGRATING ORAL EVERY 8 HOURS PRN
Status: DISCONTINUED | OUTPATIENT
Start: 2025-02-18 | End: 2025-02-19 | Stop reason: HOSPADM

## 2025-02-18 RX ORDER — FAMOTIDINE 20 MG/1
20 TABLET, FILM COATED ORAL 2 TIMES DAILY
Status: DISCONTINUED | OUTPATIENT
Start: 2025-02-18 | End: 2025-02-19 | Stop reason: HOSPADM

## 2025-02-18 RX ORDER — BUPIVACAINE HYDROCHLORIDE AND EPINEPHRINE 5; 5 MG/ML; UG/ML
INJECTION, SOLUTION PERINEURAL PRN
Status: DISCONTINUED | OUTPATIENT
Start: 2025-02-18 | End: 2025-02-18 | Stop reason: HOSPADM

## 2025-02-18 RX ORDER — SODIUM CHLORIDE, SODIUM LACTATE, POTASSIUM CHLORIDE, CALCIUM CHLORIDE 600; 310; 30; 20 MG/100ML; MG/100ML; MG/100ML; MG/100ML
INJECTION, SOLUTION INTRAVENOUS CONTINUOUS
Status: DISCONTINUED | OUTPATIENT
Start: 2025-02-18 | End: 2025-02-18

## 2025-02-18 RX ORDER — ONDANSETRON 2 MG/ML
4 INJECTION INTRAMUSCULAR; INTRAVENOUS ONCE
Status: DISCONTINUED | OUTPATIENT
Start: 2025-02-18 | End: 2025-02-18 | Stop reason: HOSPADM

## 2025-02-18 RX ORDER — MAGNESIUM HYDROXIDE 1200 MG/15ML
LIQUID ORAL CONTINUOUS PRN
Status: DISCONTINUED | OUTPATIENT
Start: 2025-02-18 | End: 2025-02-18 | Stop reason: HOSPADM

## 2025-02-18 RX ORDER — PROPOFOL 10 MG/ML
INJECTION, EMULSION INTRAVENOUS
Status: DISCONTINUED | OUTPATIENT
Start: 2025-02-18 | End: 2025-02-18 | Stop reason: SDUPTHER

## 2025-02-18 RX ORDER — OXYCODONE HYDROCHLORIDE 5 MG/1
10 TABLET ORAL EVERY 4 HOURS PRN
Status: DISCONTINUED | OUTPATIENT
Start: 2025-02-18 | End: 2025-02-19 | Stop reason: HOSPADM

## 2025-02-18 RX ORDER — DEXAMETHASONE SODIUM PHOSPHATE 4 MG/ML
INJECTION, SOLUTION INTRA-ARTICULAR; INTRALESIONAL; INTRAMUSCULAR; INTRAVENOUS; SOFT TISSUE
Status: DISCONTINUED | OUTPATIENT
Start: 2025-02-18 | End: 2025-02-18 | Stop reason: SDUPTHER

## 2025-02-18 RX ORDER — MIDAZOLAM HYDROCHLORIDE 1 MG/ML
2 INJECTION, SOLUTION INTRAMUSCULAR; INTRAVENOUS
Status: DISCONTINUED | OUTPATIENT
Start: 2025-02-18 | End: 2025-02-18 | Stop reason: HOSPADM

## 2025-02-18 RX ORDER — FENTANYL CITRATE 50 UG/ML
INJECTION, SOLUTION INTRAMUSCULAR; INTRAVENOUS
Status: DISCONTINUED | OUTPATIENT
Start: 2025-02-18 | End: 2025-02-18 | Stop reason: SDUPTHER

## 2025-02-18 RX ORDER — DIPHENHYDRAMINE HYDROCHLORIDE 50 MG/ML
6.25 INJECTION INTRAMUSCULAR; INTRAVENOUS
Status: DISCONTINUED | OUTPATIENT
Start: 2025-02-18 | End: 2025-02-18 | Stop reason: HOSPADM

## 2025-02-18 RX ORDER — ONDANSETRON 2 MG/ML
4 INJECTION INTRAMUSCULAR; INTRAVENOUS EVERY 6 HOURS PRN
Status: DISCONTINUED | OUTPATIENT
Start: 2025-02-18 | End: 2025-02-19 | Stop reason: HOSPADM

## 2025-02-18 RX ORDER — MIDAZOLAM HYDROCHLORIDE 1 MG/ML
INJECTION, SOLUTION INTRAMUSCULAR; INTRAVENOUS
Status: DISCONTINUED | OUTPATIENT
Start: 2025-02-18 | End: 2025-02-18 | Stop reason: SDUPTHER

## 2025-02-18 RX ORDER — ROCURONIUM BROMIDE 10 MG/ML
INJECTION, SOLUTION INTRAVENOUS
Status: DISCONTINUED | OUTPATIENT
Start: 2025-02-18 | End: 2025-02-18 | Stop reason: SDUPTHER

## 2025-02-18 RX ORDER — PROCHLORPERAZINE EDISYLATE 5 MG/ML
10 INJECTION INTRAMUSCULAR; INTRAVENOUS EVERY 6 HOURS PRN
Status: DISCONTINUED | OUTPATIENT
Start: 2025-02-18 | End: 2025-02-19 | Stop reason: HOSPADM

## 2025-02-18 RX ORDER — IBUPROFEN 800 MG/1
800 TABLET, FILM COATED ORAL EVERY 8 HOURS PRN
Status: DISCONTINUED | OUTPATIENT
Start: 2025-02-18 | End: 2025-02-19 | Stop reason: HOSPADM

## 2025-02-18 RX ORDER — DOCUSATE SODIUM 100 MG/1
100 CAPSULE, LIQUID FILLED ORAL 2 TIMES DAILY
Status: DISCONTINUED | OUTPATIENT
Start: 2025-02-18 | End: 2025-02-19 | Stop reason: HOSPADM

## 2025-02-18 RX ORDER — KETOROLAC TROMETHAMINE 30 MG/ML
15 INJECTION, SOLUTION INTRAMUSCULAR; INTRAVENOUS EVERY 6 HOURS PRN
Status: DISCONTINUED | OUTPATIENT
Start: 2025-02-18 | End: 2025-02-19 | Stop reason: HOSPADM

## 2025-02-18 RX ORDER — OXYCODONE HYDROCHLORIDE 5 MG/1
5 TABLET ORAL EVERY 4 HOURS PRN
Status: DISCONTINUED | OUTPATIENT
Start: 2025-02-18 | End: 2025-02-19 | Stop reason: HOSPADM

## 2025-02-18 RX ORDER — NALOXONE HYDROCHLORIDE 0.4 MG/ML
INJECTION, SOLUTION INTRAMUSCULAR; INTRAVENOUS; SUBCUTANEOUS PRN
Status: DISCONTINUED | OUTPATIENT
Start: 2025-02-18 | End: 2025-02-18 | Stop reason: HOSPADM

## 2025-02-18 RX ORDER — SODIUM CHLORIDE 9 MG/ML
INJECTION, SOLUTION INTRAVENOUS PRN
Status: DISCONTINUED | OUTPATIENT
Start: 2025-02-18 | End: 2025-02-19 | Stop reason: HOSPADM

## 2025-02-18 RX ADMIN — ONDANSETRON 4 MG: 2 INJECTION INTRAMUSCULAR; INTRAVENOUS at 11:38

## 2025-02-18 RX ADMIN — MIDAZOLAM 2 MG: 1 INJECTION INTRAMUSCULAR; INTRAVENOUS at 09:34

## 2025-02-18 RX ADMIN — IBUPROFEN 800 MG: 800 TABLET, FILM COATED ORAL at 14:57

## 2025-02-18 RX ADMIN — SUGAMMADEX 200 MG: 100 INJECTION, SOLUTION INTRAVENOUS at 11:48

## 2025-02-18 RX ADMIN — DEXAMETHASONE SODIUM PHOSPHATE 8 MG: 4 INJECTION, SOLUTION INTRAMUSCULAR; INTRAVENOUS at 09:46

## 2025-02-18 RX ADMIN — FAMOTIDINE 20 MG: 20 TABLET, FILM COATED ORAL at 14:53

## 2025-02-18 RX ADMIN — FENTANYL CITRATE 25 MCG: 50 INJECTION, SOLUTION INTRAMUSCULAR; INTRAVENOUS at 11:14

## 2025-02-18 RX ADMIN — SODIUM CHLORIDE: 9 INJECTION, SOLUTION INTRAVENOUS at 07:32

## 2025-02-18 RX ADMIN — DEXMEDETOMIDINE HYDROCHLORIDE 2 MCG: 100 INJECTION, SOLUTION INTRAVENOUS at 10:11

## 2025-02-18 RX ADMIN — CEFAZOLIN 2000 MG: 2 INJECTION, POWDER, FOR SOLUTION INTRAVENOUS at 09:38

## 2025-02-18 RX ADMIN — FENTANYL CITRATE 50 MCG: 50 INJECTION, SOLUTION INTRAMUSCULAR; INTRAVENOUS at 09:55

## 2025-02-18 RX ADMIN — FAMOTIDINE 20 MG: 20 TABLET, FILM COATED ORAL at 21:28

## 2025-02-18 RX ADMIN — FENTANYL CITRATE 50 MCG: 50 INJECTION, SOLUTION INTRAMUSCULAR; INTRAVENOUS at 09:37

## 2025-02-18 RX ADMIN — Medication 10 ML: at 21:29

## 2025-02-18 RX ADMIN — ROCURONIUM BROMIDE 10 MG: 50 INJECTION, SOLUTION INTRAVENOUS at 10:43

## 2025-02-18 RX ADMIN — FENTANYL CITRATE 25 MCG: 50 INJECTION, SOLUTION INTRAMUSCULAR; INTRAVENOUS at 11:28

## 2025-02-18 RX ADMIN — FENTANYL CITRATE 50 MCG: 50 INJECTION, SOLUTION INTRAMUSCULAR; INTRAVENOUS at 10:57

## 2025-02-18 RX ADMIN — IBUPROFEN 800 MG: 800 TABLET, FILM COATED ORAL at 22:56

## 2025-02-18 RX ADMIN — DOCUSATE SODIUM 100 MG: 100 CAPSULE, LIQUID FILLED ORAL at 21:28

## 2025-02-18 RX ADMIN — SODIUM CHLORIDE: 9 INJECTION, SOLUTION INTRAVENOUS at 11:48

## 2025-02-18 RX ADMIN — PROPOFOL 170 MG: 10 INJECTION, EMULSION INTRAVENOUS at 09:37

## 2025-02-18 RX ADMIN — ROCURONIUM BROMIDE 50 MG: 50 INJECTION, SOLUTION INTRAVENOUS at 09:38

## 2025-02-18 RX ADMIN — ACETAMINOPHEN 1000 MG: 500 TABLET ORAL at 18:43

## 2025-02-18 RX ADMIN — LIDOCAINE HYDROCHLORIDE 80 MG: 20 INJECTION, SOLUTION EPIDURAL; INFILTRATION; INTRACAUDAL; PERINEURAL at 09:37

## 2025-02-18 RX ADMIN — OXYCODONE HYDROCHLORIDE 5 MG: 5 TABLET ORAL at 12:57

## 2025-02-18 RX ADMIN — ROCURONIUM BROMIDE 20 MG: 50 INJECTION, SOLUTION INTRAVENOUS at 10:14

## 2025-02-18 RX ADMIN — DEXMEDETOMIDINE HYDROCHLORIDE 4 MCG: 100 INJECTION, SOLUTION INTRAVENOUS at 10:44

## 2025-02-18 RX ADMIN — FENTANYL CITRATE 50 MCG: 50 INJECTION, SOLUTION INTRAMUSCULAR; INTRAVENOUS at 11:47

## 2025-02-18 RX ADMIN — SIMETHICONE 80 MG: 80 TABLET, CHEWABLE ORAL at 21:28

## 2025-02-18 RX ADMIN — ROCURONIUM BROMIDE 10 MG: 50 INJECTION, SOLUTION INTRAVENOUS at 11:03

## 2025-02-18 RX ADMIN — DOCUSATE SODIUM 100 MG: 100 CAPSULE, LIQUID FILLED ORAL at 14:53

## 2025-02-18 ASSESSMENT — PAIN SCALES - GENERAL
PAINLEVEL_OUTOF10: 3
PAINLEVEL_OUTOF10: 5
PAINLEVEL_OUTOF10: 4
PAINLEVEL_OUTOF10: 4

## 2025-02-18 ASSESSMENT — PAIN - FUNCTIONAL ASSESSMENT
PAIN_FUNCTIONAL_ASSESSMENT: 0-10
PAIN_FUNCTIONAL_ASSESSMENT: ACTIVITIES ARE NOT PREVENTED

## 2025-02-18 ASSESSMENT — ENCOUNTER SYMPTOMS
COUGH: 0
NAUSEA: 0
SHORTNESS OF BREATH: 0
VOMITING: 0

## 2025-02-18 ASSESSMENT — PAIN DESCRIPTION - DESCRIPTORS
DESCRIPTORS: ACHING
DESCRIPTORS: DISCOMFORT;DULL

## 2025-02-18 ASSESSMENT — PAIN DESCRIPTION - LOCATION
LOCATION: ABDOMEN
LOCATION: ABDOMEN;INCISION

## 2025-02-18 NOTE — ANESTHESIA PRE PROCEDURE
Department of Anesthesiology  Preprocedure Note       Name:  Tiffany Lozano   Age:  49 y.o.  :  1975                                          MRN:  [id number]         Date:  2025      Surgeon: Surgeon(s):  Marie Marr DO Holbrook, Abigail L, DO    Procedure: Procedure(s):  LAPAROSCOPIC ASSISTED VAGINAL HYSTERECTOMY, BILATERAL SALPINGECTOMY    Medications prior to admission:   Prior to Admission medications    Not on File       Current medications:    Current Facility-Administered Medications   Medication Dose Route Frequency Provider Last Rate Last Admin   • lactated ringers infusion   IntraVENous Continuous Parrish Marques MD       • sodium chloride flush 0.9 % injection 5-40 mL  5-40 mL IntraVENous 2 times per day Parrish Marques MD       • sodium chloride flush 0.9 % injection 5-40 mL  5-40 mL IntraVENous PRN Parrish Marques MD       • 0.9 % sodium chloride infusion   IntraVENous PRN Parrish Marques MD       • midazolam (VERSED) injection 2 mg  2 mg IntraVENous Once PRN Parrish Marques MD       • sodium chloride flush 0.9 % injection 5-40 mL  5-40 mL IntraVENous 2 times per day Marie Marr DO       • sodium chloride flush 0.9 % injection 5-40 mL  5-40 mL IntraVENous PRN Marie Marr DO       • 0.9 % sodium chloride infusion   IntraVENous PRN Marie Marr  mL/hr at 25 0732 New Bag at 25 0732   • ceFAZolin (ANCEF) 2,000 mg in sterile water 20 mL IV syringe  2,000 mg IntraVENous On Call to OR Marie Marr DO           Allergies:  No Known Allergies    Problem List:    Patient Active Problem List   Diagnosis Code   • Class 1 obesity due to excess calories without serious comorbidity with body mass index (BMI) of 31.0 to 31.9 in adult E66.811, E66.09, Z68.31   • Menorrhagia with regular cycle N92.0   • Mixed hyperlipidemia E78.2       Past Medical History:        Diagnosis Date   • Abnormal Pap smear of cervix    • Herpes simplex virus (HSV) infection

## 2025-02-18 NOTE — OP NOTE
Department of Gynecology  Operative Report        Pre-operative Diagnosis:    Abnormal uterine bleeding  Dysmenorrhea  S/p Endometrial ablation    Post-operative Diagnosis:    Abnormal uterine bleeding  Dysmenorrhea  S/p Endometrial ablation  Pelvic adhesive disease    Procedure:    Laparoscopic assisted vaginal hysterectomy with bilateral salpingectomy and lysis of adhesions  Cystoscopy    Surgeon:  ABIGAIL Marr DO     Assistant(s):  ABIGAIL Snyder DO     Anesthesia:  General Endotracheal Anesthesia    Findings:    Anteverted uterus with adhesions involving the vesicouterine peritoneum, the omentum and the anterior abdominal wall  Significant omental adhesions to the anterior abdominal wall  Normal appearing bilateral fallopian tubes and ovaries  Essure coils palpable in bilateral fallopian tubes  Numerous nabothian cysts within the cervix  Normal appearing cervix otherwise  Normal appearing external genitalia  Bladder endothelium intact without masses, lesions or trauma  Bilateral ureters effluxing clear urine  Rectal exam with rectal mucosa intact without trauma or suture    Total IV fluids:  1000 ml    Urine Output:  600 ml    Estimated blood loss:  100ml    Blood Transfusion?:  No     Drains:  none    Specimens:  Uterus, cervix, bilateral fallopian tubes    Complications:  none    Condition:  Stable to PACU    Indications and Consent  Tiffany Lozano is a 49 y.o.  with a history of bleeding that has persisted despite endometrial ablation as well as progressively worsening pain with menses.  Risks, benefits and alternatives were reviewed.  Risks include, but are not limited to: infection, bleeding, injury to the uterus and surrounding pelvic structures, risks of anesthesia and even death.  All questions were answered to the patient's satisfaction.  Patient wishes to proceed with Laparoscopic assisted vaginal hysterectomy with bilateral salpingectomy.  Consents are signed and in chart.       Procedure:   Patient

## 2025-02-18 NOTE — H&P
Pre-Op History and Physical     HPI: Tiffany Lozano is 49 y.o. female who presents for scheduled Laparoscopic assisted vaginal hysterectomy with bilateral salpingectomy     Indications:  AUB s/p endometrial ablation, dysmenorrhea     Review of Systems   Constitutional:  Negative for chills and fever.   Respiratory:  Negative for cough and shortness of breath.    Cardiovascular:  Negative for chest pain.   Gastrointestinal:  Negative for nausea and vomiting.   Genitourinary:  Positive for menstrual problem and pelvic pain.   All other systems reviewed and are negative.     OBGYN Provider : Marie Marr DO     Gynecologic History:   Reports history of abnormal pap smears    Obstetrical History:  OB History          3    Para   2    Term   2       0    AB   1    Living   2         SAB   0    IAB   1    Ectopic   0    Molar        Multiple   0    Live Births   2                 Past Medical History:   Past Medical History:   Diagnosis Date    Abnormal Pap smear of cervix     Herpes simplex virus (HSV) infection     Mixed hyperlipidemia 2024    Wears contact lenses         Medications:  Prior to Admission medications    Not on File        Allergies:  No Known Allergies     Surgical History:  Past Surgical History:   Procedure Laterality Date     SECTION      x 2    COLONOSCOPY N/A 8/10/2022    COLONOSCOPY, POSSIBLE POLYPECTOMY performed by Jeffery Ulloa MD at [facility] ENDOSCOPY    DILATION AND CURETTAGE OF UTERUS N/A 2019    VIDEO HYSTEROSCOPY, DILATATION AND CURETTAGE, NOVASURE ENDOMETRIAL ABLATION performed by Marie Rolle DO at [facility] OR    TUBAL LIGATION      Essure    WISDOM TOOTH EXTRACTION          Family History:  Family History   Problem Relation Age of Onset    Depression Mother     Heart Attack Father     Heart Disease Father     Prostate Cancer Father 75    No Known Problems Sister     Breast Cancer Maternal Grandmother 70    Cancer Maternal Grandmother     Cancer

## 2025-02-18 NOTE — PROGRESS NOTES
Post-Operative Gynecology Progress Note  [facility] Obstetrics and Gynecology    Subjective:  Tiffany Lozano is a 49 y.o. female who is s/p BRANDY WESTBROOK, cysto, POD #0    Patient is doing well today without complaints.  Pain is well controlled.  Tolerating regular diet without nausea or vomiting.  Ambulating without difficulty, denies dizziness on standing.  Hull catheter in place, denies passage of flatus.  Denies chest pain, shortness of breath, fever, chills, calf pain or tenderness.      Review of Systems - The following ROS was otherwise negative, except as noted in the HPI: constitutional, respiratory, cardiovascular, gastrointestinal, genitourinary    Objective:  Vitals:    02/18/25 1411   BP: (!) 142/80   Pulse: 80   Resp: 16   Temp: 97.8 °F (36.6 °C)   SpO2:        General: Alert, well appearing, no acute distress  Heart: Regular rate and rhythm, no murmurs, gallops or rubs  Lungs: Respirations unlabored, clear to auscultation bilaterally. No wheezes, rhonchi or rales  Abdomen: Soft, appropriately tender to palpation, non-distended, no rebound or guarding.  Incisions with dressing in place, clean, dry and intact.  No significant drainage or surrounding erythema.   Extremities: No redness or tenderness in LE, neg Palmer's sign,  SCDs in place bilaterally     No intake/output data recorded.  I/O this shift:  In: 1000 [I.V.:1000]  Out: 100 [Blood:100]     Lab Results:   Recent Labs     02/18/25  1458 02/18/25  0725   WBC  --  13.3*   HGB 13.5 13.6   HCT 40.8 41.1   MCV  --  86.6   PLT  --  335        Assessment/Plan:    Tiffany Lozano is a 49 y.o. female who is s/p BRANDY, BS, cysto    POD #0     - Doing well, meeting milestones     - Adequate urine output - 400ml of clear urine in hull     - Adequate pain control     - Tolerating regular diet     - Routine post-op care    Disposition:   In house for routine post-operative care.  Anticipate discharge home POD #1    Marie Marr DO

## 2025-02-18 NOTE — H&P
[facility] Ob/Gyn  History and Physical Attestation    Patient seen and evaluated prior to surgery.  Patient reports no changes in medical condition.  There have been no changes in the patient's medications or assessment.  Preoperative tests and diagnostics have been reviewed. Surgery remains indicated as noted in History and Physical (H&P).  Prophylactic antibiotics, use of beta-blockers and VTE prophylaxis have been addressed/ordered as indicated.  Please see H&P and orders.     History and Physical performed by patient's PCP Dr. Murphy on 1/10/2025 with an update on 2/10/2025.   \"Tiffany Lozano remains appropriate for surgery as scheduled.\"    All questions were answered to the patient's satisfaction.  Consents are signed and on chart.  Will proceed with procedure as planned.      Marie Marr, DO

## 2025-02-18 NOTE — PROGRESS NOTES
Patient arrived to PACU bay 4, phase one initiated. Placed on bedside monitor, VSS. Report obtained from OR RN and anesthesia. Oral airway in place. Patient on O2 via nasal cannula at 5L. See flowsheets for assessment. Side rails in place, will monitor patient closely.

## 2025-02-18 NOTE — PLAN OF CARE
Problem: Pain  Goal: Verbalizes/displays adequate comfort level or baseline comfort level  Outcome: Progressing     Problem: Skin/Tissue Integrity  Goal: Skin integrity remains intact  Description: 1.  Monitor for areas of redness and/or skin breakdown  2.  Assess vascular access sites hourly  3.  Every 4-6 hours minimum:  Change oxygen saturation probe site  4.  Every 4-6 hours:  If on nasal continuous positive airway pressure, respiratory therapy assess nares and determine need for appliance change or resting period  Outcome: Progressing

## 2025-02-18 NOTE — PLAN OF CARE
Problem: Pain  Goal: Verbalizes/displays adequate comfort level or baseline comfort level  2/18/2025 1616 by Myriam Santiago RN  Outcome: Progressing  2/18/2025 1435 by Saravanan Salinas RN  Outcome: Progressing     Problem: Skin/Tissue Integrity  Goal: Skin integrity remains intact  Description: 1.  Monitor for areas of redness and/or skin breakdown  2.  Assess vascular access sites hourly  3.  Every 4-6 hours minimum:  Change oxygen saturation probe site  4.  Every 4-6 hours:  If on nasal continuous positive airway pressure, respiratory therapy assess nares and determine need for appliance change or resting period  2/18/2025 1616 by Myriam Santiago RN  Outcome: Progressing  2/18/2025 1435 by Saravanan Salinas RN  Outcome: Progressing     Problem: Chronic Conditions and Co-morbidities  Goal: Patient's chronic conditions and co-morbidity symptoms are monitored and maintained or improved  Outcome: Progressing     Problem: Safety - Adult  Goal: Free from fall injury  Outcome: Progressing

## 2025-02-19 VITALS
BODY MASS INDEX: 32.1 KG/M2 | DIASTOLIC BLOOD PRESSURE: 88 MMHG | TEMPERATURE: 98.2 F | OXYGEN SATURATION: 96 % | HEIGHT: 61 IN | HEART RATE: 75 BPM | RESPIRATION RATE: 16 BRPM | SYSTOLIC BLOOD PRESSURE: 147 MMHG | WEIGHT: 170 LBS

## 2025-02-19 LAB
BASOPHILS # BLD: 0.1 K/UL (ref 0–0.2)
BASOPHILS NFR BLD: 0.4 %
DEPRECATED RDW RBC AUTO: 13.1 % (ref 12.4–15.4)
EOSINOPHIL # BLD: 0 K/UL (ref 0–0.6)
EOSINOPHIL NFR BLD: 0.1 %
HCT VFR BLD AUTO: 36.7 % (ref 36–48)
HGB BLD-MCNC: 12.1 G/DL (ref 12–16)
LYMPHOCYTES # BLD: 3.9 K/UL (ref 1–5.1)
LYMPHOCYTES NFR BLD: 17.7 %
MCH RBC QN AUTO: 28.7 PG (ref 26–34)
MCHC RBC AUTO-ENTMCNC: 33 G/DL (ref 31–36)
MCV RBC AUTO: 87 FL (ref 80–100)
MONOCYTES # BLD: 1.5 K/UL (ref 0–1.3)
MONOCYTES NFR BLD: 7.1 %
NEUTROPHILS # BLD: 16.3 K/UL (ref 1.7–7.7)
NEUTROPHILS NFR BLD: 74.7 %
PLATELET # BLD AUTO: 328 K/UL (ref 135–450)
PMV BLD AUTO: 8 FL (ref 5–10.5)
RBC # BLD AUTO: 4.22 M/UL (ref 4–5.2)
WBC # BLD AUTO: 21.9 K/UL (ref 4–11)

## 2025-02-19 PROCEDURE — 85025 COMPLETE CBC W/AUTO DIFF WBC: CPT

## 2025-02-19 PROCEDURE — 36415 COLL VENOUS BLD VENIPUNCTURE: CPT

## 2025-02-19 PROCEDURE — 99024 POSTOP FOLLOW-UP VISIT: CPT | Performed by: OBSTETRICS & GYNECOLOGY

## 2025-02-19 PROCEDURE — 6370000000 HC RX 637 (ALT 250 FOR IP): Performed by: OBSTETRICS & GYNECOLOGY

## 2025-02-19 RX ORDER — PSEUDOEPHEDRINE HCL 30 MG
100 TABLET ORAL 2 TIMES DAILY PRN
Qty: 60 CAPSULE | Refills: 0 | Status: SHIPPED | OUTPATIENT
Start: 2025-02-19 | End: 2025-02-19

## 2025-02-19 RX ORDER — OXYCODONE AND ACETAMINOPHEN 5; 325 MG/1; MG/1
1 TABLET ORAL EVERY 6 HOURS PRN
Qty: 28 TABLET | Refills: 0 | Status: SHIPPED | OUTPATIENT
Start: 2025-02-19 | End: 2025-02-26

## 2025-02-19 RX ORDER — PSEUDOEPHEDRINE HCL 30 MG
100 TABLET ORAL 2 TIMES DAILY PRN
Qty: 60 CAPSULE | Refills: 0 | Status: SHIPPED | OUTPATIENT
Start: 2025-02-19

## 2025-02-19 RX ORDER — IBUPROFEN 800 MG/1
800 TABLET, FILM COATED ORAL EVERY 8 HOURS PRN
Qty: 60 TABLET | Refills: 0 | Status: SHIPPED | OUTPATIENT
Start: 2025-02-19

## 2025-02-19 RX ADMIN — FAMOTIDINE 20 MG: 20 TABLET, FILM COATED ORAL at 07:58

## 2025-02-19 RX ADMIN — DOCUSATE SODIUM 100 MG: 100 CAPSULE, LIQUID FILLED ORAL at 07:59

## 2025-02-19 RX ADMIN — ACETAMINOPHEN 1000 MG: 500 TABLET ORAL at 02:58

## 2025-02-19 RX ADMIN — IBUPROFEN 800 MG: 800 TABLET, FILM COATED ORAL at 07:58

## 2025-02-19 ASSESSMENT — PAIN SCALES - GENERAL
PAINLEVEL_OUTOF10: 3
PAINLEVEL_OUTOF10: 2

## 2025-02-19 ASSESSMENT — PAIN DESCRIPTION - LOCATION
LOCATION: ABDOMEN;INCISION
LOCATION: ABDOMEN
LOCATION: ABDOMEN

## 2025-02-19 ASSESSMENT — PAIN DESCRIPTION - ORIENTATION
ORIENTATION: LOWER
ORIENTATION: LOWER

## 2025-02-19 ASSESSMENT — PAIN DESCRIPTION - DESCRIPTORS
DESCRIPTORS: SORE
DESCRIPTORS: CRAMPING

## 2025-02-19 NOTE — DISCHARGE SUMMARY
[facility] Ob/Gyn  GYN Discharge Summary        Admitting Diagnosis:   Abnormal uterine bleeding  Dysmenorrhea  History of endometrial ablation    Discharge Diagnosis:   Abnormal uterine bleeding  Dysmenorrhea  History of endometrial ablation  Pelvic adhesive disease    Procedures:   Laparoscopic assisted vaginal hysterectomy with bilateral salpingectomy and lysis of adhesions  Cystoscopy    Referrals:   1. None    Reason for Hospitalization:   1. Post-op care    Hospital Course:   Tiffany Lozano is a 49 y.o. female with a history of AUB and dysmenorrhea following endometrial ablation.  Presents for scheduled LAVH, BS.  Patient underwent procedure as scheduled.  Post-operative course is uncomplicated.  Patient seen and examined and discharged home on POD #1 in stable condition, meeting milestones.     Please see electronic record for complete hospitalization details.     Complications:   None     Discharge Instructions:  Please call for increased pain not controlled by pain medications, significant vaginal bleeding greater than 1 pad/hour, temperature greater than 101 degrees F or any other concerns. Plan to follow up in 1-2 weeks.      Diet:common adult    Activity:  Increase activity gradually.  Pelvic rest for 6 weeks.  No intercourse, tampons, douching, baths.   No heavy lifting or driving for 2 weeks    Medications:   - Ibuprofen    - Percocet   - Colace     Disposition: Home    Condition on discharge: Stable    Follow up: in 1-2 week(s)    Marie Marr DO

## 2025-02-19 NOTE — PROGRESS NOTES
Pt verbalized understanding of verbal and written discharge instructions and denies having questions at this time. Pt left unit at 0945 by wheelchair to parent vehicle.Pt in stable condition

## 2025-02-19 NOTE — ANESTHESIA POSTPROCEDURE EVALUATION
Department of Anesthesiology  Postprocedure Note    Patient: Tiffany Lozano  MRN: [id number]  YOB: 1975  Date of evaluation: 2/19/2025    Procedure Summary       Date: 02/18/25 Room / Location: [address]    Anesthesia Start: 0933 Anesthesia Stop: 1214    Procedure: LAPAROSCOPIC ASSISTED VAGINAL HYSTERECTOMY, BILATERAL SALPINGECTOMY, LYSIS OF ADHESIONS AND CYSTOSCOPY (Abdomen) Diagnosis:       Dysmenorrhea      Status post endometrial ablation      (Dysmenorrhea [N94.6])      (Status post endometrial ablation [Z98.890])    Surgeons: Marie Marr DO Responsible Provider: Chris Clinton MD    Anesthesia Type: general ASA Status: 2            Anesthesia Type: No value filed.    Dario Phase I: Dario Score: 10    Dario Phase II:      Anesthesia Post Evaluation    Comments: Anes Post-op Note    Name:    Tiffany Lozano  MRN:      [id number]    Patient Vitals in the past 12 hrs:  02/19/25 0920, BP:(!) 147/88, Temp:98.2 °F (36.8 °C), Temp src:Oral, Pulse:75, Resp:16, SpO2:96 %  02/19/25 0600, BP:138/79, Pulse:95, Resp:16     LABS:    CBC  Lab Results       Component                Value               Date/Time                  WBC                      21.9 (H)            02/19/2025 06:01 AM        HGB                      12.1                02/19/2025 06:01 AM        HCT                      36.7                02/19/2025 06:01 AM        PLT                      328                 02/19/2025 06:01 AM   RENAL  Lab Results       Component                Value               Date/Time                  NA                       139                 02/18/2025 07:25 AM        K                        4.0                 02/18/2025 07:25 AM        CL                       105                 02/18/2025 07:25 AM        CO2                      22                  02/18/2025 07:25 AM        BUN                      17                  02/18/2025 07:25 AM        CREATININE

## 2025-02-19 NOTE — PROGRESS NOTES
Post-Operative Gynecology Progress Note  [facility] Obstetrics and Gynecology    Subjective:  Tiffany Lozano is a 49 y.o. female who is s/p BRANDY WESTBROOK, cysto, POD #1    Patient is doing well today without complaints.  Pain is well controlled.  Tolerating regular diet without nausea or vomiting.  Ambulating without difficulty, denies dizziness on standing.  Reaves catheter has been removed, has not voided.  Reports passage of flatus.  Denies chest pain, shortness of breath, fever, chills, calf pain or tenderness.      Review of Systems - The following ROS was otherwise negative, except as noted in the HPI: constitutional, respiratory, cardiovascular, gastrointestinal, genitourinary    Objective:  Vitals:    02/19/25 0600   BP: 138/79   Pulse: 95   Resp: 16   Temp:    SpO2:        General: Alert, well appearing, no acute distress  Heart: Regular rate and rhythm, no murmurs, gallops or rubs  Lungs: Respirations unlabored, clear to auscultation bilaterally. No wheezes, rhonchi or rales  Abdomen: Soft, appropriately tender to palpation, non-distended, no rebound or guarding.  Incisions with dressing in place, clean, dry and intact.  Small area of drainage on RLQ incision, not expanding. No significant drainage or surrounding erythema.   Extremities: No redness or tenderness in LE, neg Palmer's sign,  SCDs in place bilaterally     I/O last 3 completed shifts:  In: 1010 [I.V.:1010]  Out: 2050 [Urine:1950; Blood:100]  No intake/output data recorded.     Lab Results:   Recent Labs     02/19/25  0601 02/18/25  1458 02/18/25  0725   WBC 21.9*  --  13.3*   HGB 12.1 13.5 13.6   HCT 36.7 40.8 41.1   MCV 87.0  --  86.6     --  335        Assessment/Plan:    Tiffany Lozano is a 49 y.o. female who is s/p DARIANA NAVA, cysto    POD #1     - Doing well, meeting milestones     - Adequate urine output - awaiting spontaneous void     - Adequate pain control     - Tolerating regular diet     - Routine post-op care    Disposition:   Stable for

## 2025-03-03 ENCOUNTER — OFFICE VISIT (OUTPATIENT)
Dept: OBGYN CLINIC | Age: 50
End: 2025-03-03

## 2025-03-03 VITALS
SYSTOLIC BLOOD PRESSURE: 124 MMHG | WEIGHT: 173.8 LBS | BODY MASS INDEX: 32.84 KG/M2 | DIASTOLIC BLOOD PRESSURE: 68 MMHG | HEART RATE: 81 BPM | OXYGEN SATURATION: 97 %

## 2025-03-03 DIAGNOSIS — Z09 POSTOPERATIVE EXAMINATION: Primary | ICD-10-CM

## 2025-03-03 PROCEDURE — 99024 POSTOP FOLLOW-UP VISIT: CPT | Performed by: OBSTETRICS & GYNECOLOGY

## 2025-03-10 NOTE — PROGRESS NOTES
Return Gyn Office Visit    CC:   Chief Complaint   Patient presents with    Post-Op Check       HPI:  Tiffany Lozano is a 49 y.o. female who presents for post-op visit following BRANDY BS, lysis of adhesions, cystoscopy.      Patient is seen and examined today. Doing well today without complaints.     Reports pain is well controlled.  Denies vaginal bleeding.  Denies concerns with bowel or bladder function.  Tolerating regular diet without nausea or vomiting.  Ambulating without difficulty, denies dizziness on standing.  Denies chest pain, shortness of breath, fever, chills.     Review of Systems - The following ROS was otherwise negative, except as noted in the HPI: constitutional, respiratory, cardiovascular, gastrointestinal, genitourinary    Objective:  /68 (BP Site: Right Upper Arm, Patient Position: Sitting, BP Cuff Size: Medium Adult)   Pulse 81   Wt 78.8 kg (173 lb 12.8 oz)   LMP 01/18/2025   SpO2 97%   BMI 32.84 kg/m²     Physical Exam  Vitals reviewed.   Constitutional:       General: She is not in acute distress.     Appearance: She is well-developed.   HENT:      Head: Normocephalic and atraumatic.   Eyes:      Conjunctiva/sclera: Conjunctivae normal.   Cardiovascular:      Rate and Rhythm: Normal rate.   Pulmonary:      Effort: Pulmonary effort is normal. No respiratory distress.   Abdominal:      General: There is no distension.      Palpations: Abdomen is soft.      Tenderness: There is no abdominal tenderness. There is no guarding or rebound.      Comments: Incisions healing well without evidence of infection   Musculoskeletal:         General: Normal range of motion.   Skin:     General: Skin is warm and dry.   Neurological:      Mental Status: She is alert and oriented to person, place, and time.   Psychiatric:         Mood and Affect: Mood normal.         Behavior: Behavior normal.         Thought Content: Thought content normal.       Pathology      FINAL DIAGNOSIS:     Uterus and cervix

## 2025-04-02 ENCOUNTER — OFFICE VISIT (OUTPATIENT)
Dept: OBGYN CLINIC | Age: 50
End: 2025-04-02

## 2025-04-02 VITALS
SYSTOLIC BLOOD PRESSURE: 122 MMHG | DIASTOLIC BLOOD PRESSURE: 70 MMHG | HEART RATE: 70 BPM | WEIGHT: 176.2 LBS | TEMPERATURE: 98.7 F | HEIGHT: 61 IN | BODY MASS INDEX: 33.27 KG/M2

## 2025-04-02 DIAGNOSIS — Z09 POSTOPERATIVE EXAMINATION: Primary | ICD-10-CM

## 2025-04-02 PROCEDURE — 99024 POSTOP FOLLOW-UP VISIT: CPT | Performed by: OBSTETRICS & GYNECOLOGY

## 2025-05-15 ENCOUNTER — OFFICE VISIT (OUTPATIENT)
Dept: PRIMARY CARE CLINIC | Age: 50
End: 2025-05-15
Payer: COMMERCIAL

## 2025-05-15 VITALS
BODY MASS INDEX: 33.97 KG/M2 | OXYGEN SATURATION: 96 % | DIASTOLIC BLOOD PRESSURE: 75 MMHG | SYSTOLIC BLOOD PRESSURE: 118 MMHG | HEART RATE: 66 BPM | TEMPERATURE: 98 F | WEIGHT: 179.8 LBS

## 2025-05-15 DIAGNOSIS — R42 DIZZINESS: Primary | ICD-10-CM

## 2025-05-15 DIAGNOSIS — Z90.710 S/P LAPAROSCOPIC ASSISTED VAGINAL HYSTERECTOMY (LAVH): ICD-10-CM

## 2025-05-15 DIAGNOSIS — R73.9 ELEVATED SERUM GLUCOSE: Primary | ICD-10-CM

## 2025-05-15 DIAGNOSIS — E78.2 MIXED HYPERLIPIDEMIA: ICD-10-CM

## 2025-05-15 DIAGNOSIS — Z00.00 ANNUAL PHYSICAL EXAM: ICD-10-CM

## 2025-05-15 PROCEDURE — 99213 OFFICE O/P EST LOW 20 MIN: CPT | Performed by: FAMILY MEDICINE

## 2025-05-15 RX ORDER — LORATADINE 10 MG/1
10 TABLET ORAL DAILY
COMMUNITY

## 2025-05-15 ASSESSMENT — ENCOUNTER SYMPTOMS
COUGH: 0
ABDOMINAL PAIN: 0
NAUSEA: 0
SORE THROAT: 0
SHORTNESS OF BREATH: 0

## 2025-05-15 NOTE — PROGRESS NOTES
Normal rate and regular rhythm.      Heart sounds: No murmur heard.  Pulmonary:      Effort: Pulmonary effort is normal.      Breath sounds: Normal breath sounds. No wheezing.   Abdominal:      General: Bowel sounds are normal.      Palpations: Abdomen is soft.      Tenderness: There is no abdominal tenderness.   Musculoskeletal:         General: No swelling or deformity.      Cervical back: Neck supple.   Lymphadenopathy:      Cervical: No cervical adenopathy.   Skin:     General: Skin is warm and dry.      Findings: No rash.   Neurological:      Mental Status: She is alert and oriented to person, place, and time.      Cranial Nerves: No cranial nerve deficit.      Sensory: Sensation is intact.      Motor: Motor function is intact.      Coordination: Coordination is intact.      Gait: Gait is intact.   Psychiatric:         Mood and Affect: Mood normal.         Behavior: Behavior is cooperative.         Assessment:  Encounter Diagnoses   Name Primary?    Transient dizziness - suspect BPPV Yes    S/P laparoscopic assisted vaginal hysterectomy (LAVH)     Mixed hyperlipidemia        Plan:  1. Transient dizziness - suspect BPPV  Clinical history and exam most consistent with BPPV.  No significant focal changes on exam, but discussed multiple etiologies that can contribute to this.  At this time I have provided extensive patient education regarding expected course of this and management at home.  Provided precautions and answered all questions, follow-up as needed.    2. S/P laparoscopic assisted vaginal hysterectomy (LAVH)    3. Mixed hyperlipidemia  Get labs for wellness later this summer      Return if symptoms worsen or fail to improve.               Edmond Murphy, DO     Please note that this chart was generated using dragon dictation software.  Although every effort was made to ensure the accuracy of this automated transcription, some errors in transcription may have occurred.

## 2025-05-15 NOTE — PATIENT INSTRUCTIONS
[facility] Locations - No appointment necessary.  ? indicates the location is open Saturdays in addition to Monday through Friday.   Call your preferred location for test preparation, business hours and other information you need.   [facility] accepts all insurances.  [facility]  [place]    ? [place]   [address]   [address]   [place] [address]    Ph: [phone]  [facility]   [address] [place] [address]    Ph: [phone]   ? [place]   [address]    [place] [address]    Ph: [phone]     [facility]   [address] [place] [address]    Ph: [phone] ? [place]   [address] [place] [address]   Ph: [phone]  ? [facility]   [address] [place] [address]    Ph: [phone]      [place]   [address] [place] [address]   Ph: [phone]     [facility]  [address] [place] [place] [address]   Ph: [phone]    [place]    ? [facility]   [address] [address]    Ph: [phone]  [facility]   [address] [place] [address]   Ph: [phone]  [place]   [address] [place], [place] [address]vd.   [facility] [address]    PH: [phone]    [facility]   [address] [place] [address]    Ph: [phone]  [place]  [address] [place] [address]  Ph: [phone]  [facility]   [address] [place] [address]    Ph: [phone]

## 2025-06-24 DIAGNOSIS — Z00.00 ANNUAL PHYSICAL EXAM: ICD-10-CM

## 2025-06-24 DIAGNOSIS — E78.2 MIXED HYPERLIPIDEMIA: ICD-10-CM

## 2025-06-24 DIAGNOSIS — R73.9 ELEVATED SERUM GLUCOSE: ICD-10-CM

## 2025-06-24 LAB
ALBUMIN SERPL-MCNC: 4.2 G/DL (ref 3.4–5)
ALBUMIN/GLOB SERPL: 1.4 {RATIO} (ref 1.1–2.2)
ALP SERPL-CCNC: 82 U/L (ref 40–129)
ALT SERPL-CCNC: 19 U/L (ref 10–40)
ANION GAP SERPL CALCULATED.3IONS-SCNC: 10 MMOL/L (ref 3–16)
AST SERPL-CCNC: 23 U/L (ref 15–37)
BASOPHILS # BLD: 0.1 K/UL (ref 0–0.2)
BASOPHILS NFR BLD: 0.6 %
BILIRUB SERPL-MCNC: <0.2 MG/DL (ref 0–1)
BUN SERPL-MCNC: 13 MG/DL (ref 7–20)
CALCIUM SERPL-MCNC: 9.1 MG/DL (ref 8.3–10.6)
CHLORIDE SERPL-SCNC: 108 MMOL/L (ref 99–110)
CHOLEST SERPL-MCNC: 210 MG/DL (ref 0–199)
CO2 SERPL-SCNC: 23 MMOL/L (ref 21–32)
CREAT SERPL-MCNC: 1 MG/DL (ref 0.6–1.1)
DEPRECATED RDW RBC AUTO: 13.5 % (ref 12.4–15.4)
EOSINOPHIL # BLD: 0.3 K/UL (ref 0–0.6)
EOSINOPHIL NFR BLD: 3.2 %
GFR SERPLBLD CREATININE-BSD FMLA CKD-EPI: 69 ML/MIN/{1.73_M2}
GLUCOSE P FAST SERPL-MCNC: 101 MG/DL (ref 70–99)
GLUCOSE SERPL-MCNC: 101 MG/DL (ref 70–99)
HCT VFR BLD AUTO: 38.5 % (ref 36–48)
HDLC SERPL-MCNC: 34 MG/DL (ref 40–60)
HGB BLD-MCNC: 12.7 G/DL (ref 12–16)
LDLC SERPL CALC-MCNC: 140 MG/DL
LYMPHOCYTES # BLD: 3.2 K/UL (ref 1–5.1)
LYMPHOCYTES NFR BLD: 31 %
MCH RBC QN AUTO: 28.6 PG (ref 26–34)
MCHC RBC AUTO-ENTMCNC: 32.9 G/DL (ref 31–36)
MCV RBC AUTO: 86.8 FL (ref 80–100)
MONOCYTES # BLD: 0.7 K/UL (ref 0–1.3)
MONOCYTES NFR BLD: 6.9 %
NEUTROPHILS # BLD: 6 K/UL (ref 1.7–7.7)
NEUTROPHILS NFR BLD: 58.3 %
PLATELET # BLD AUTO: 268 K/UL (ref 135–450)
PMV BLD AUTO: 9.5 FL (ref 5–10.5)
POTASSIUM SERPL-SCNC: 4.9 MMOL/L (ref 3.5–5.1)
PROT SERPL-MCNC: 7.3 G/DL (ref 6.4–8.2)
RBC # BLD AUTO: 4.44 M/UL (ref 4–5.2)
SODIUM SERPL-SCNC: 141 MMOL/L (ref 136–145)
TRIGL SERPL-MCNC: 178 MG/DL (ref 0–150)
TSH SERPL DL<=0.005 MIU/L-ACNC: 1.76 UIU/ML (ref 0.27–4.2)
WBC # BLD AUTO: 10.3 K/UL (ref 4–11)

## 2025-06-25 LAB
EST. AVERAGE GLUCOSE BLD GHB EST-MCNC: 111.2 MG/DL
HBA1C MFR BLD: 5.5 %

## 2025-06-27 ENCOUNTER — OFFICE VISIT (OUTPATIENT)
Dept: PRIMARY CARE CLINIC | Age: 50
End: 2025-06-27
Payer: COMMERCIAL

## 2025-06-27 VITALS
BODY MASS INDEX: 33.19 KG/M2 | DIASTOLIC BLOOD PRESSURE: 75 MMHG | OXYGEN SATURATION: 97 % | HEART RATE: 70 BPM | TEMPERATURE: 98.2 F | SYSTOLIC BLOOD PRESSURE: 114 MMHG | HEIGHT: 61 IN | WEIGHT: 175.8 LBS

## 2025-06-27 DIAGNOSIS — E66.811 CLASS 1 OBESITY DUE TO EXCESS CALORIES WITHOUT SERIOUS COMORBIDITY WITH BODY MASS INDEX (BMI) OF 33.0 TO 33.9 IN ADULT: ICD-10-CM

## 2025-06-27 DIAGNOSIS — Z00.00 ANNUAL PHYSICAL EXAM: Primary | ICD-10-CM

## 2025-06-27 DIAGNOSIS — Z12.31 BREAST CANCER SCREENING BY MAMMOGRAM: ICD-10-CM

## 2025-06-27 DIAGNOSIS — Z23 NEED FOR DIPHTHERIA-TETANUS-PERTUSSIS (TDAP) VACCINE: ICD-10-CM

## 2025-06-27 DIAGNOSIS — E78.2 MIXED HYPERLIPIDEMIA: ICD-10-CM

## 2025-06-27 DIAGNOSIS — E66.09 CLASS 1 OBESITY DUE TO EXCESS CALORIES WITHOUT SERIOUS COMORBIDITY WITH BODY MASS INDEX (BMI) OF 33.0 TO 33.9 IN ADULT: ICD-10-CM

## 2025-06-27 PROCEDURE — 90715 TDAP VACCINE 7 YRS/> IM: CPT | Performed by: FAMILY MEDICINE

## 2025-06-27 PROCEDURE — 90471 IMMUNIZATION ADMIN: CPT | Performed by: FAMILY MEDICINE

## 2025-06-27 PROCEDURE — 99396 PREV VISIT EST AGE 40-64: CPT | Performed by: FAMILY MEDICINE

## 2025-06-27 ASSESSMENT — ENCOUNTER SYMPTOMS
SORE THROAT: 0
DIARRHEA: 0
ABDOMINAL PAIN: 0
WHEEZING: 0
EYE PAIN: 0
NAUSEA: 0
COUGH: 0
CONSTIPATION: 0
SHORTNESS OF BREATH: 0
VOMITING: 0
EYE ITCHING: 0

## 2025-06-27 NOTE — PROGRESS NOTES
effort is normal.      Breath sounds: Normal breath sounds. No wheezing.   Abdominal:      General: Bowel sounds are normal.      Palpations: Abdomen is soft. There is no mass.      Tenderness: There is no abdominal tenderness.   Musculoskeletal:         General: No swelling. Normal range of motion.      Cervical back: Neck supple.   Lymphadenopathy:      Cervical: No cervical adenopathy.   Skin:     General: Skin is warm and dry.      Findings: No rash.   Neurological:      General: No focal deficit present.      Mental Status: She is alert and oriented to person, place, and time.      Cranial Nerves: No cranial nerve deficit.   Psychiatric:         Mood and Affect: Mood normal.         Assessment:  Encounter Diagnoses   Name Primary?    Annual physical exam Yes    Breast cancer screening by mammogram     Class 1 obesity due to excess calories without serious comorbidity with body mass index (BMI) of 33.0 to 33.9 in adult     Need for diphtheria-tetanus-pertussis (Tdap) vaccine     Mixed hyperlipidemia        Plan:  1. Annual physical exam  General wellness exam. Reviewed chart for past hx and updated today. Counseled on age appropriate health guidance and discussed screening recommendations. Vaccinations reviewed and discussed. All questions answered    2. Breast cancer screening by mammogram  Pt of appropriate age/risk for breast cancer screening. Discussion had today of mammogram screening and order placed. Discussed risks/benefits of screening. All questions answered.  - ADARSH DIGITAL SCREEN W OR WO CAD BILATERAL; Future    3. Class 1 obesity due to excess calories without serious comorbidity with body mass index (BMI) of 33.0 to 33.9 in adult  Patient was asked about current diet and exercise habits, and personalized advice was provided regarding recommended lifestyle changes.  Patient's comorbid health conditions associated with elevated BMI were discussed, as well as the likely benefits weight loss.  Based

## 2025-08-07 ENCOUNTER — HOSPITAL ENCOUNTER (OUTPATIENT)
Dept: MAMMOGRAPHY | Age: 50
Discharge: HOME | End: 2025-08-07
Payer: COMMERCIAL

## 2025-08-07 VITALS — HEIGHT: 61 IN | BODY MASS INDEX: 33.61 KG/M2 | WEIGHT: 178 LBS

## 2025-08-07 DIAGNOSIS — Z12.31 BREAST CANCER SCREENING BY MAMMOGRAM: ICD-10-CM

## 2025-08-07 PROCEDURE — 77063 BREAST TOMOSYNTHESIS BI: CPT

## (undated) DEVICE — KIT THERMOABLATION 6MM ENDOMET DEV NOVASURE

## (undated) DEVICE — GLOVE SURG SZ 65 L12IN FNGR THK94MIL STD WHT LTX FREE

## (undated) DEVICE — X-RAY DETECTABLE SPONGES,16 PLY: Brand: VISTEC

## (undated) DEVICE — SET ADMIN PRIMING 7ML L30IN 7.35LB 20 GTT 2ND RLER CLMP

## (undated) DEVICE — CATHETER IV 20GA L1.25IN PNK FEP SFTY STR HUB RADPQ DISP

## (undated) DEVICE — TUBING, SUCTION, 3/16" X 12', STRAIGHT: Brand: MEDLINE

## (undated) DEVICE — SYRINGE MED 10ML TRNSLUC BRL PLUNG BLK MRK POLYPR CTRL

## (undated) DEVICE — DRAPE,UNDERBUTTOCKS,PCH,STERILE: Brand: MEDLINE

## (undated) DEVICE — SHEET,DRAPE,53X77,STERILE: Brand: MEDLINE

## (undated) DEVICE — LIGHT HANDLE: Brand: DEVON

## (undated) DEVICE — SEALER LAP L37CM MARYLAND JAW OPN NANO COAT MULTIFUNCTIONAL

## (undated) DEVICE — TOWEL,OR,DSP,ST,BLUE,STD,4/PK,20PK/CS: Brand: MEDLINE

## (undated) DEVICE — GAUZE,SPONGE,2"X2",3PLY,NS,LF: Brand: MEDLINE

## (undated) DEVICE — PACK LITH I INCL DRP W ATTCH LEG BK TBL CVR ABSRB TWL CUST

## (undated) DEVICE — 1LYRTR 16FR10ML 100%SILI SNAP: Brand: MEDLINE INDUSTRIES, INC.

## (undated) DEVICE — SET FLD MGMT CTRL SYS INFLO TB AQUILEX

## (undated) DEVICE — GAUZE,PACKING STRIP,IODOFORM,2"X5YD,STRL: Brand: CURAD

## (undated) DEVICE — GLOVE SURG SZ 65 L12IN FNGR THK79MIL GRN LTX FREE

## (undated) DEVICE — GLOVE SURG SZ 65 THK91MIL LTX FREE SYN POLYISOPRENE

## (undated) DEVICE — SET ENDOSCP SEAL HYSTEROSCOPE RIG OUTFLO CHN DISP MYOSURE

## (undated) DEVICE — GLOVE,SURG,SENSICARE,ALOE,LF,PF,6: Brand: MEDLINE

## (undated) DEVICE — PVC URETHRAL CATHETER: Brand: DOVER

## (undated) DEVICE — 3M™ STERI-STRIP™ COMPOUND BENZOIN TINCTURE 40 BAGS/CARTON 4 CARTONS/CASE C1544: Brand: 3M™ STERI-STRIP™

## (undated) DEVICE — TIP SUCT STD FLNG RIG RIB 5IN1 CONN NVENT W/ SECUR GRP HNDL

## (undated) DEVICE — GLOVE SURG SZ 6 THK91MIL LTX FREE SYN POLYISOPRENE ANTI

## (undated) DEVICE — PENCIL ES L3M ROCK SWCH S STL HEX LOK BLDE ELECTRD HOLSTER

## (undated) DEVICE — NEEDLE,22GX1.5",REG,BEVEL: Brand: MEDLINE

## (undated) DEVICE — TRAY CATH CATH OD16FR 200ML URIN M SIL CNTR ENTRY F

## (undated) DEVICE — Y-TYPE TUR/BLADDER IRRIGATION SET, REGULATING CLAMP

## (undated) DEVICE — 3M™ STERI-STRIP™ REINFORCED ADHESIVE SKIN CLOSURES, R1547, 1/2 IN X 4 IN (12 MM X 100 MM), 6 STRIPS/ENVELOPE: Brand: 3M™ STERI-STRIP™

## (undated) DEVICE — TROCAR: Brand: KII FIOS FIRST ENTRY

## (undated) DEVICE — GAUZE,SPONGE,4"X4",16PLY,XRAY,STRL,LF: Brand: MEDLINE

## (undated) DEVICE — TROCAR: Brand: KII SLEEVE

## (undated) DEVICE — SOLUTION IV 1000ML LAC RINGERS PH 6.5 INJ USP VIAFLX PLAS

## (undated) DEVICE — SET FLD MGMT OUTFLO TB DISP FOR CTRL SYS AQUILEX

## (undated) DEVICE — SET GRAV VENT NVENT CK VLV 3 NDL FREE PRT 10 GTT

## (undated) DEVICE — TRAY PREP DRY W/ PREM GLV 2 APPL 6 SPNG 2 UNDPD 1 OVERWRAP

## (undated) DEVICE — SPONGE LAP W18XL18IN WHT COT 4 PLY FLD STRUNG RADPQ DISP ST 2 PER PACK

## (undated) DEVICE — Device: Brand: PRIME MEDICAL LLC

## (undated) DEVICE — SUTURE MONOCRYL SZ 4-0 L27IN ABSRB UD L19MM PS-2 1/2 CIR PRIM Y426H

## (undated) DEVICE — TELFA NON-ADHERENT ABSORBENT DRESSING: Brand: TELFA

## (undated) DEVICE — 3M™ TEGADERM™ TRANSPARENT FILM DRESSING FRAME STYLE, 1624W, 2-3/8 IN X 2-3/4 IN (6 CM X 7 CM), 100/CT 4CT/CASE: Brand: 3M™ TEGADERM™

## (undated) DEVICE — SUTURE VICRYL SZ 0 L36IN ABSRB UD CT-1 L36MM 1/2 CIR TAPR PNT VCP946H

## (undated) DEVICE — LAP SPONGE  18 X 18 IN. PREWASHED SOFTPACK X-RAY DETECTABLE: Brand: CARDINAL HEALTH

## (undated) DEVICE — SYRINGE IRRIG 60ML SFT PLIABLE BLB EZ TO GRP 1 HND USE W/

## (undated) DEVICE — GOWN,SIRUS,NON REINFRCD,LARGE,SET IN SL: Brand: MEDLINE

## (undated) DEVICE — SCALPEL SURG NO10 S STL ABS PLAS HNDL SAFT DISP

## (undated) DEVICE — SUTURE VICRYL + SZ 3-0 L36IN ABSRB UD L36MM CT-1 1/2 CIR VCP944H

## (undated) DEVICE — PUMP SUC IRR TBNG L10FT W/ HNDPC ASSEMB STRYKEFLOW 2

## (undated) DEVICE — Device

## (undated) DEVICE — EXTENSION SET, 2 INJECTION SITES, MALE LUER LOCK ADAPTER WITH RETRACTABLE COLLAR: Brand: INTERLINK

## (undated) DEVICE — DRESSING TEGADERM CLR ACRYLIC OVAL SM 3X3.75IN